# Patient Record
Sex: MALE | Race: BLACK OR AFRICAN AMERICAN | Employment: UNEMPLOYED | URBAN - METROPOLITAN AREA
[De-identification: names, ages, dates, MRNs, and addresses within clinical notes are randomized per-mention and may not be internally consistent; named-entity substitution may affect disease eponyms.]

---

## 2017-02-07 ENCOUNTER — OFFICE VISIT (OUTPATIENT)
Dept: SURGERY | Age: 42
End: 2017-02-07

## 2017-02-07 VITALS
DIASTOLIC BLOOD PRESSURE: 90 MMHG | BODY MASS INDEX: 39.7 KG/M2 | HEART RATE: 91 BPM | WEIGHT: 247 LBS | SYSTOLIC BLOOD PRESSURE: 110 MMHG | OXYGEN SATURATION: 95 % | TEMPERATURE: 98.2 F | RESPIRATION RATE: 20 BRPM | HEIGHT: 66 IN

## 2017-02-07 DIAGNOSIS — E11.22 TYPE 2 DIABETES MELLITUS WITH CHRONIC KIDNEY DISEASE ON CHRONIC DIALYSIS, WITHOUT LONG-TERM CURRENT USE OF INSULIN (HCC): ICD-10-CM

## 2017-02-07 DIAGNOSIS — E66.01 MORBID OBESITY, UNSPECIFIED OBESITY TYPE (HCC): Primary | ICD-10-CM

## 2017-02-07 DIAGNOSIS — N18.6 TYPE 2 DIABETES MELLITUS WITH CHRONIC KIDNEY DISEASE ON CHRONIC DIALYSIS, WITHOUT LONG-TERM CURRENT USE OF INSULIN (HCC): ICD-10-CM

## 2017-02-07 DIAGNOSIS — Z99.2 TYPE 2 DIABETES MELLITUS WITH CHRONIC KIDNEY DISEASE ON CHRONIC DIALYSIS, WITHOUT LONG-TERM CURRENT USE OF INSULIN (HCC): ICD-10-CM

## 2017-02-07 DIAGNOSIS — I10 ESSENTIAL HYPERTENSION: ICD-10-CM

## 2017-02-07 NOTE — PROGRESS NOTES
1. Have you been to the ER, urgent care clinic since your last visit? Hospitalized since your last visit? new patient    2. Have you seen or consulted any other health care providers outside of the [address] [place] [address] since your last visit? Include any pap smears or colon screening. New patient          Pavan Lopez. Body composition    male  35 y.o. Vitals:    02/07/17 1305   BP: 110/90   Pulse: 91   Resp: 20   Temp: 98.2 °F (36.8 °C)   SpO2: 95%   Weight: 247 lb (112 kg)   Height: 5' 6\" (1.676 m)     Body mass index is 39.87 kg/(m^2). Dilia Raoul Neck- 19.5 inches  Waist-47.5 inches  Hips-46.25 inches  Frame size-7.5 medium

## 2017-02-09 PROBLEM — N18.6 ESRD (END STAGE RENAL DISEASE) (HCC): Status: ACTIVE | Noted: 2017-02-09

## 2017-02-09 PROBLEM — E11.29 TYPE 2 DIABETES MELLITUS WITH KIDNEY COMPLICATION, WITHOUT LONG-TERM CURRENT USE OF INSULIN (HCC): Status: ACTIVE | Noted: 2017-02-09

## 2017-02-09 PROBLEM — E66.01 MORBID OBESITY (HCC): Status: ACTIVE | Noted: 2017-02-09

## 2017-02-09 PROBLEM — I10 ESSENTIAL HYPERTENSION: Status: ACTIVE | Noted: 2017-02-09

## 2017-02-09 NOTE — PATIENT INSTRUCTIONS
Learning About How to Prepare for Weight-Loss Surgery  How can you prepare for weight-loss surgery? Having weight-loss surgery (also called bariatric surgery) is a big step. You can prepare for surgery by having a plan. Your plan may include your goals for losing weight and how to makes changes in your diet, activity, and lifestyle to help raise your chances of success. One way to prepare for surgery is to think about your goal or reason why you want to reach a healthy weight. Do you want to lower your blood pressure, cholesterol, or blood sugar? Do you want to be able to sleep better, play with your kids, or walk around the block? Having a reason can help you stay with your plan and meet your goals. Your weight-loss surgery team can help you meet your goals and get ready for surgery. Jasmine Gr work with a team that's trained to help you lose weight and make healthy changes in your life. This team may include:  · A medical doctor or nurse to help manage your care and schedule tests before surgery. · A surgeon who specializes in weight-loss surgery. · A registered dietitian to help you plan meals and make changes in the way you eat. · An exercise specialist to help you be more active and get stronger. · A therapist or counselor to help you learn why you eat and teach you ways to deal with stress and your emotions. Your team will also be there to help you prepare for life after surgery. They will help you adjust to new ways of eating and changes to your body. How will weight-loss surgery affect your life? You have likely thought a lot about how surgery may affect your life--how you will eat, how your body will look, or how you will feel. Some people feel overwhelmed with these changes. But planning can help you prepare for the changes and meet your weight-loss goals. One important step in your plan is to learn about the ways surgery will affect your life. These may include:  · A slimmer you.  You probably will lose weight very quickly in the first few months after surgery. As time goes on, your weight loss will slow down. How much weight you lose depends on what type of surgery you had and how well your new eating and activity plans are working for you. · A new way of eating. Success in reaching and keeping a healthy weight depends on making lifelong changes in how you eat. After surgery, you raise your chances of success if you:  ¨ Eat just a few ounces of food at a time. ¨ Eat very slowly and chew your food to mush. ¨ Don't drink for 30 minutes before you eat, during your meal, and for 30 minutes after you eat. ¨ Are careful about drinking alcohol. ¨ Avoid foods that are high in fat or sugar. ¨ Take vitamin and mineral supplements. · A healthier you. Weight-loss surgery can have some real health benefits. Problems like diabetes, high blood pressure, and sleep apnea may go away--or at least become easier to manage. · A more active you. After surgery, being active on most days of the week will help you reach your weight goal and avoid gaining back the weight you lose. · A lot of extra skin. When you lose weight quickly, you may have a lot of extra skin. That's normal. You can have surgery to remove the extra skin if it bothers you. There are going to be some ups and downs while you get used to these changes. So another way to adjust is to identify who can help support you. Getting support from friends and family can help. And joining a support group for people who have had the surgery can be a big help too, because they know what you're going through. As you know, it's a big decision to have weight-loss surgery. But when you have a plan, you can focus on losing weight and living a healthier life. So what steps can you take to prepare for weight-loss surgery? Will you set some goals? Will you learn about how surgery can affect your life? How about asking family or friends for help? Write out your plan.  Then get ready. Where can you learn more? Go to http://britt-gita.info/. Enter F613 in the search box to learn more about \"Learning About How to Prepare for Weight-Loss Surgery. \"  Current as of: February 16, 2016  Content Version: 11.1  © 0584-9312 [employer]. Care instructions adapted under license by [employer] (which disclaims liability or warranty for this information). If you have questions about a medical condition or this instruction, always ask your healthcare professional. [address] any warranty or liability for your use of this information.

## 2017-02-09 NOTE — PROGRESS NOTES
Bariatric Surgery Consultation    Subjective: The patient is a 35 y.o. obese  male with a Body mass index is 39.87 kg/(m^2). Linden Canchola The patient has been at his heaviest weight for the past 2 years;  he has been overweight since young adulthood;  he has been considering surgery since 2016;  he desires surgery at this time because he desires renal transplant. Nae Titus. has tried multiple diets in his lifetime most recently tried unsupervised diets. Bariatric comorbidities present are   Patient Active Problem List   Diagnosis Code    Morbid obesity ([facility] E66.01    Essential hypertension I10    Type 2 diabetes mellitus with kidney complication, without long-term current use of insulin ([facility]) E11.29    ESRD (end stage renal disease) ([facility] N18.6     The patient desires laparoscopic sleeve gastrectomy for surgical weight loss. The patients goal weight is 160 lbs. The highest acceptable weight is 190 lbs. These goals are consistent with expected outcomes of their desired operation. his Medical goals are renal transplantation; his qualty of life goals are decreased medications.     Patient Active Problem List    Diagnosis Date Noted    Morbid obesity ([facility] 02/09/2017    Essential hypertension 02/09/2017    Type 2 diabetes mellitus with kidney complication, without long-term current use of insulin ([facility] 02/09/2017    [facility]) [facility] 02/09/2017      Past Surgical History   Procedure Laterality Date    Hx other surgical       left A-V graft    Hx other surgical  12/2015     Tinkcoff cath for PD, used 2 months then removed      Social History   Substance Use Topics    Smoking status: Never Smoker    Smokeless tobacco: Never Used    Alcohol use No      Family History   Problem Relation Age of Onset    Diabetes Mother     Hypertension Mother     Diabetes Maternal Aunt     Hypertension Maternal Aunt     Diabetes Maternal Grandmother     Hypertension Maternal Grandmother       Prior to Admission medications    Medication Sig Start Date End Date Taking? Authorizing Provider   HYDRALAZINE HCL (HYDRALAZINE PO) Take  by mouth. Yes Historical Provider   CARVEDILOL PO Take  by mouth. Yes Historical Provider   AMLODIPINE BESYLATE (AMLODIPINE PO) Take  by mouth. Yes Historical Provider   CALCIUM CARBONATE PO Take  by mouth three (3) times daily (with meals). Yes Historical Provider     No Known Allergies      Review of Systems:    Constitutional: positive for weight gain  Eyes: negative  Ears, nose, mouth, throat, and face: positive for snoring  Respiratory: positive for dyspnea on exertion, negative for wheezing  Cardiovascular: negative for chest pressure/discomfort, palpitations  Gastrointestinal: negative for reflux symptoms, nausea, vomiting and abdominal pain  Genitourinary:negative for urinary incontinence  Integument/breast: negative  Hematologic/lymphatic: negative  Musculoskeletal:positive for stiff joints and neck pain  Neurological: negative for paresthesia    Objective:     Visit Vitals    /90 (BP 1 Location: Right arm, BP Patient Position: Sitting)    Pulse 91    Temp 98.2 °F (36.8 °C)    Resp 20    Ht 5' 6\" (1.676 m)    Wt 247 lb (112 kg)    SpO2 95%    BMI 39.87 kg/m2       Physical Exam:  GENERAL: alert, cooperative, no distress, appears stated age, morbidly obese, EYE: negative, LYMPHATIC: Cervical, supraclavicular nodes normal. THROAT & NECK: normal, LUNG: clear to auscultation bilaterally, HEART: regular rate and rhythm, S1, S2 normal, no murmur. ABDOMEN: Obese, soft, non-tender. Bowel sounds normal. No masses,  no organomegaly, no hernia. EXTREMITIES:  Left upper arm AV fistula. SKIN: Normal., NEUROLOGIC: negative. Assessment:     Morbid obesity with comorbidities; no success with medical management. Plan:     laparoscopic sleeve gastrectomy    This is a 35 y.o. male with a BMI of Body mass index is 39.87 kg/(m^2).  and the weight-related co-morbidties listed above. Robyn Ann meets the NIH criteria for bariatric surgery based upon the BMI of Body mass index is 39.87 kg/(m^2). and multiple weight-related co-morbidities. Robyn Ann has elected laparoscopic sleeve gastrectomy as his intervention of choice for treatment of morbid obesity through surgical means secondary to its balance of safety and efficacy. In the office today, following Garrick's history and physical examination, a 30 minute discussion regarding the anatomic alterations for the laparoscopic sleeve gastrectomy was undertaken. The dietary expectations and the patient and physician dependent factors for success were thoroughly discussed, to include the need for interval follow-up and long-term dietary changes associated with success. The possible complications of the sleeve gastrectomy were also discussed, to include VTE, staple line leak, bleeding, stricture, infection, conversion to open procedure, and sleeve dilation. Specific weight related outcomes for success were also discussed with an emphasis on careful and close follow-up with the first year. The patient expressed an understanding of the above factors, and his questions were answered in their entirety. In addition, the patient attended a 1.5 hour power point seminar regarding obesity, surgical weight loss including, adjustable gastric band, gastric bypass, and sleeve gastrectomy. This discussion contrasted the different surgical techniques, mechanisms of actions and expected outcomes, and surgical and medical risks associated with each procedure. During this seminar, there was a long question and answer session where each questions was answered until there were no additional questions. Today, the patient had all of his questions answered and desires to proceed with pre-qualification for bariatric surgery initially choosing sleeve gastrectomy as his surgical option.  He will schedule Psychology evaluation and 4-month weight/diet encounters; we will arrange [facility] evaluation.     Signed By: Rod Garcia MD     February 9, 2017

## 2017-02-23 ENCOUNTER — HOSPITAL ENCOUNTER (OUTPATIENT)
Dept: DIABETES SERVICES | Age: 42
Discharge: HOME | End: 2017-02-23
Payer: COMMERCIAL

## 2017-02-23 PROCEDURE — G0108 DIAB MANAGE TRN  PER INDIV: HCPCS | Performed by: DIETITIAN, REGISTERED

## 2017-02-23 NOTE — DIABETES MGMT
[facility]  - Diabetes Assessment and   Pre-operative Bariatric Nutrition Education    2017    Referring Physician/Surgeon:   Dr. Nena Fuller  NAME: Dulce Caballero. : 1975 AGE: 35 y.o. GENDER: male  REASON FOR VISIT:  laparoscopic sleeve gastrectomy    Assessment:        Past Medical History:   Diagnosis Date    Chronic kidney disease     Diabetes ([facility]        Diabetes Medications:   none    Vitamins:     Vitamin D and Multivitamin    Food Allergies/Intolerances: none    Exercise/Physical Activity: light yard work, somewalking outside the house and pt currently on dialysis 3 days/week and disability leave from work     Anthropometrics:   Ht Readings from Last 1 Encounters:   17 5' 6\" (1.676 m)      Weight: 247.4 lb   BMI:39.9 kg/M     Obesity Class: 2    Laboratory:  No results found for: HBA1C, HGBE8, [id number]     Reported Weight History: yo-yo    Diet History: Weight Watchers     Reported Diabetes History:   Checks BG 2 times per day - fasting and after dinner.       24 hour Diet recall:  Breakfast - turkey sanchez, eggs, toast, apple/orange juice   Lunch - ham and cheese sandwich with kwon (if eating out [employer])  Dinner - baked chicken, veggie, starch (if eating out [place] food)  Snacks - [employer] chips, snack cakes, orange (halo or cuties) or apple   Beverages - apple or orange juice, water, SF Luis Angel Aid    Nutrition Diagnosis:    Food and Nutrition Related Knowledge Deficit     Lack of prior diabetes nutrition related education  Self-Monitoring Deficit    Lack of focus and and attention to detail, difficulty with time management and/or   organization    Incomplete self monitoring records i.e.  BS logs, food logs    Uncertainty of how to complete monitoring records    Verbalizes incomplete or inaccurate knowledge  Physical Inactivity   Injury, lifestyle change, condition,  physical disability or limitation that reduces   physical activityor activiites of daily living   Excessive Energy Intake     Food and nutrition related knowledge deficit concerning energy intake   Lack of value for behavior change,competing values   Unwilling or disinterested in reducing energy intake   Failure to adjust for restricted mobility due to physical limitations    BMI > 25   ` Increased body adiposity  Intake of high caloric density or large portions of food/beverage      Nutrition/Diabetes Intervention:  Patient educated on nutrition recommendations for weight loss surgery, specifically:   Reviewed intake  Understanding label reading  Understanding low carbohydrates, low sugar, higher protein meals  Understanding proper portions  Instruction given for personal dietary changes  Discussed perceived compliance       Instructed on consuming 3 meals per day, consistent in carbohydrate, starting now. Use the balanced plate method to plan meals, include 3 ounces of lean source of protein, 1/2 cup whole grains, unlimited non-starchy vegetables, 1/2 cup fruit and 1 serving of low fat dairy. Utilize handouts listing healthy snack and meal ideas to limit restaurant meals. After surgery, measure all meals to 1/2 cup. Each meal will contain a 1/4 cup lean protein and 1/4 cup fruit, non-starchy vegetable or starch (limititng to once per day). Aim for 60 grams protein per day. Sip on 48-64 ounces of sugar free, calorie free, non-carbonated beverages each day. Do not use a straw. Do not consume beverages 30 minutes before, during or 30 minutes after meals. Read all nutrition labels. Demonstrated and emphasized identifying serving size, total fat, total carbohydrate, and protein content. Defined low fat as < 3 grams per serving. Discussed lean and extra lean sources of protein. Discussed how excess sugar and fat intake may lead to dumping syndrome. Practice mindful eating habits; take small bites, chew thoroughly, avoid distractions, utilize hunger/fullness scale.   Consume meals over 20-30 minutes. Attend Bariatric Support Group and increase physical activity (approved per MD) for long term weight maintenance. Patient to check blood glucose levels 2 times per day and to continue all diabetes medications unless instructed otherwise by MD.      Nutrition Monitoring and Evaluation: The following goals were established with the patient:  -Continue checking blood sugar 2x/day   -Begin using plate method handout to put meals together   -Take at least 20-30 minutes to consume meals       Plan:     Specific tips and techniques to facilitate compliance with above recommendations were provided and discussed. Pt was encouraged to begin making necessary changes now and attend support group        []      Patient appears appropriate for surgery at this time    [x]      Patient does not appear appropriate for surgery at this time because pt not currently consuming well balanced meals or monitoring portion sizes     [x]      Patient to follow up with [facility] on 3/10/17 to reevaluate readiness for surgery - and review BG log and food log       My phone number and e-mail was provided to the patient for any further questions or concerns.         Nanette Ridley, [address] [facility]  Office: [phone]

## 2017-03-10 ENCOUNTER — HOSPITAL ENCOUNTER (OUTPATIENT)
Dept: DIABETES SERVICES | Age: 42
Discharge: HOME | End: 2017-03-10

## 2017-03-10 DIAGNOSIS — N18.6 TYPE 2 DIABETES MELLITUS WITH ESRD (END-STAGE RENAL DISEASE) (HCC): ICD-10-CM

## 2017-03-10 DIAGNOSIS — E66.01 MORBID OBESITY, UNSPECIFIED OBESITY TYPE (HCC): ICD-10-CM

## 2017-03-10 DIAGNOSIS — E11.22 TYPE 2 DIABETES MELLITUS WITH ESRD (END-STAGE RENAL DISEASE) (HCC): ICD-10-CM

## 2017-03-10 NOTE — DIABETES MGMT
[facility]  - Diabetes Assessment and   Pre-operative Bariatric Nutrition Education    3/10/2017    Referring Physician/Surgeon:   Dr. Beba Guerra  NAME: Estrada Godinez. : 1975 AGE: 35 y.o.   GENDER: male  REASON FOR FOLLOW UP VISIT:  laparoscopic sleeve gastrectomy    Assessment:        Diabetes Self Care Update:   Since last visit pt has begun implementing changes necessary in preparation for surgery:  -Taking 20-30 minutes to complete meals  -Taking small bites of food  -Sipping on beverages       Diabetes Medications:   none    Exercise/Physical Activity: Pt is walking 3x/week      Anthropometrics:   Ht Readings from Last 1 Encounters:   17 5' 6\" (1.676 m)      Weight: 245.6 lb           Down 1.8lbs  BMI: 39.6 kg/M         Laboratory:  No results found for: HBA1C, HGBE8, [id number]     Goal achievement from previous visit ? : yes       Nutrition Diagnosis:    Food and Nutrition Related Knowledge Deficit     Prior exposure to incorrect information   Unwilling or disinterested in learning/applying information    Verbalizes inaccurate or incomplete information    No prior knowledge of need for food and nutrition recommendations    No prior education provided on how to apply food and nutrition     recommendations    Verbalizes unwillingness or disinterest in learning information  Physical Inactivity   Injury, lifestyle change, condition,  physical disability or limitation that reduces   physical activityor activiites of daily living    Time constraints    Medical dx that may be associated with or results in decreased activity,    e.g. arthritis, morbid obesity, knee surgery  Excessive Energy Intake     Food and nutrition related knowledge deficit concerning energy intake   Failure to adjust for restricted mobility due to physical limitations    BMI > 25   ` Increased body adiposity      Nutrition/Diabetes Intervention:  Patient educated on nutrition recommendations for weight loss surgery, specifically Reviewed intake  Understanding proper portions  Instruction given for personal dietary changes     . Instructed on consuming 3 meals per day, consistent in carbohydrate, starting now. Use the balanced plate method to plan meals, include 3 ounces of lean source of protein, 1/2 cup whole grains, unlimited non-starchy vegetables, 1/2 cup fruit and 1 serving of low fat dairy. Utilize handouts listing healthy snack and meal ideas to limit restaurant meals. After surgery, measure all meals to 1/2 cup. Each meal will contain a 1/4 cup lean protein and 1/4 cup fruit, non-starchy vegetable or starch (limititng to once per day). Aim for 60 grams protein per day. Sip on 48-64 ounces of sugar free, calorie free, non-carbonated beverages each day. Do not use a straw. Do not consume beverages 30 minutes before, during or 30 minutes after meals. Read all nutrition labels. Demonstrated and emphasized identifying serving size, total fat, total carbohydrate, and protein content. Defined low fat as < 3 grams per serving. Discussed lean and extra lean sources of protein. Discussed how excess sugar and fat intake may lead to dumping syndrome. Practice mindful eating habits; take small bites, chew thoroughly, avoid distractions, utilize hunger/fullness scale. Consume meals over 20-30 minutes. Attend Bariatric Support Group and increase physical activity (approved per MD) for long term weight maintenance. Patient to check blood glucose levels 2 times per day and to continue all diabetes medications unless instructed otherwise by MD.      Nutrition Monitoring and Evaluation:     The following goals were established/continued :    -Continue checking BG 2x/day (BG also checked during dialysis sessions)  -Continue using plate images provided for well balanced meals    Plan:           [x]      Patient appears appropriate for surgery at this time      My phone number and e-mail was provided to the patient for any further questions or concerns.       Reuben Jackson, [address]  Office: [phone]

## 2017-04-06 ENCOUNTER — CLINICAL SUPPORT (OUTPATIENT)
Dept: SURGERY | Age: 42
End: 2017-04-06

## 2017-04-06 VITALS — BODY MASS INDEX: 40.03 KG/M2 | WEIGHT: 248 LBS

## 2017-04-06 DIAGNOSIS — E66.01 MORBID OBESITY WITH BMI OF 40.0-44.9, ADULT (HCC): Primary | ICD-10-CM

## 2017-04-06 NOTE — PROGRESS NOTES
[facility] General Surgery at [facility]  Supervised Weight Loss     Date:   2017    Patient's Name: Kristen Sellers. : 1975    Insurance:  Medicare          Session:   Surgery: Sleeve Gastrectomy  Surgeon:  Keith Montoya M.D. Height: 66\"  Weight:    248      Lbs. BMI: 40   Pounds Lost since last month: n/a               Pounds Gained since last month: n/a     Starting Weight: 248#   Previous Months Weight: n/a   Overall Pounds Lost: n/a   Overall Pounds Gained: n/a     Other Pertinent Information: Patient reports being a dialysis patient. Has [facility]. No HgbA1c value was indicated during those visits. Smoking Status:  none  Alcohol Intake: none    I have reviewed with patient the guidelines of the supervised weight loss class. Patient understands the expectations of some weight loss during the weight loss trial.  Patient understands that weight gain could delay the process. I have also expressed to patient that classes need to be consecutive. Missing a class may subject patient to have to start their trial over. Patient has received this information in writing. Changes that patient has made since last month include:  Walking, portion control. Eating Habits and Behaviors      Today we reviewed the general diet principles for weight loss surgery. We discussed portion control before surgery using the balanced plate method (1/2 plate non-starchy vegetables, 1/4 coming from lean meat, and 1/4 of their plate coming from carbohydrates). We talked about the importance of measuring meals to 1/2 cup total after surgery and making at least half of the meal lean protein. Emphasis was placed on the importance of eating 3 meals a day and aiming for 60 grams of protein per day. I educated the patient on limiting liquid calories and drinking only calorie-free, sugar-free and non-carbonated beverages.  We discussed the importance of drinking 64 ounces of fluid per day to prevent dehydration post-operatively. Patient's current diet habits include: eating 3 meals a day, snacking on chips and cookies. Eating chips, pretzels, crackers, bread, pasta, rice and cereal 4 times per week. Eating cookies, cake and donuts 3 times per week. Pt and wife share grocery shopping and cooking. Eating a mixture of baked, grilled, broiled and fried foods. Eating out is 1-3 times per week. Drinking 32 oz water daily (will need to verify if patient has a fluid restriction d/t dialysis). Reports sometimes emotional eating and yes to situational eating. Is not packing meals when away from home. Eating most meals at a table and takes 10 minutes or less to finish the meal. States night eating and portion sizes are his biggest challenge with losing weight. Physical Activity/Exercise  During class we discussed the importance of increasing daily physical activity and beginning to develop an exercise regimen/routine. An education lesson was provided including exercise programs and resources, tips for getting started and overcoming barriers and health benefits of exercise. We discussed that exercise is an important part of long term weight loss. Comments:  During class, I discussed with patient the importance of getting into an exercise routine. Patient is currently walking for activity. States lack of motivation is a challenge for being consistent. Patient has been encouraged to consider short intervals or focusing on health benefit of exercise to stay motivated. Increase frequency, duration and/or intensity of walking over time. Behavior Modification       Comments:  Patient was encouraged to keep a food journal and record what they were taking in daily. We discussed the importance of making certain behavior changes in order to be successful long term after weight loss surgery.           Overall Assessment: Patient completes his first session of supervised weight loss today. Difficult to assess readiness at this time d/t limited participation in group discussion and did not ask any questions. Concerns for pt regarding possible fluid restriction d/t being a dialysis patient. Has [facility] however, HgbA1c value was obtained during those appointments. Based on patient's answers to diet questionnaire today he has some very important changes to make with current eating habits and food choices. Will continue to assess as he works to complete supervised weight loss requirements. Goals:   1. Nutrition - portion control   2. Exercise - exercise 4 times per week  3.  Behavior -more positive about exercise     Juliano Jasmine, RD  4/6/2017

## 2017-05-04 ENCOUNTER — CLINICAL SUPPORT (OUTPATIENT)
Dept: SURGERY | Age: 42
End: 2017-05-04

## 2017-05-04 VITALS — BODY MASS INDEX: 39.71 KG/M2 | WEIGHT: 246 LBS

## 2017-05-04 DIAGNOSIS — E66.9 OBESITY (BMI 30-39.9): Primary | ICD-10-CM

## 2017-05-04 NOTE — PROGRESS NOTES
[facility] General Surgery at [facility]  Supervised Weight Loss     Date:   2017    Patient's Name: Kristy Jones. : 1975    Insurance:  Medicare          Session: 2 of  4  Surgery: Sleeve Gastrectomy  Surgeon:  Alexander Dumont M.D. Height: 66\"   Weight:    246      Lbs. BMI: 39   Pounds Lost since last month: 2#               Pounds Gained since last month: 0    Starting Weight: 248#   Previous Months Weight: 248#  Overall Pounds Lost: 2#  Overall Pounds Gained: 0    Other Pertinent Information: n/a     Smoking Status:  None   Alcohol Intake: none     I have reviewed with patient the guidelines of the supervised weight loss class. Patient understands the expectations of some weight loss during the weight loss trial.  Patient understands that weight gain could delay the process. I have also expressed to patient that classes need to be consecutive. Missing a class may subject patient to have to start their trial over. Patient has received this information in writing. Changes that patient has made since last month include:  More walking, only baked foods. Eating Habits and Behaviors      Today we reviewed the general diet principles for weight loss surgery. An education lesson was provided regarding carbohydrates including which types of foods provide carbohydrates and the difference between complex carbohydrates and simple carbohydrates. We discussed tips for reading food labels to identify added sugars for help with making healthier food choices. We discussed portion control before surgery using the balanced plate method (1/2 plate non-starchy vegetables, 1/4 coming from lean meat, and 1/4 of their plate coming from carbohydrates). We talked about the importance of measuring meals to 1/2 cup total after surgery and making at least half of the meal lean protein. Emphasis was placed on the importance of eating 3 meals a day and aiming for 60 grams of protein per day. I educated the patient on limiting liquid calories and drinking only calorie-free, sugar-free and non-carbonated beverages. We discussed the importance of drinking 64 ounces of fluid per day to prevent dehydration post-operatively. Patient's current diet habits include: eating 4 meals per day, snacking on chips once a day. Avoiding sweets/desserts. Pt and wife share grocery shopping and cooking. Eating mostly baked, grilled and broiled foods. Never eating out at restaurants. Drinking 32 oz water daily or every 2 days because of a fluid restriction d/t dialysis. 12 oz unsweetened tea. Denies emotional eating and yes to situational eating. Packing meals when away from home. Eating most meals at a table and takes 15-20 minutes to finish the meal. Reports grazing and snacking as biggest barriers to weight loss. Physical Activity/Exercise  During class we discussed the importance of increasing daily physical activity and beginning to develop an exercise regimen/routine. An education lesson was provided including exercise programs and resources, tips for getting started and overcoming barriers and health benefits of exercise. We discussed that exercise is an important part of long term weight loss. Comments:  During class, I discussed with patient the importance of getting into an exercise routine. Patient is currently walking for activity. Patient has been encouraged to increase frequency, duration and/or intensity for wt loss. Behavior Modification       Comments:  Patient was encouraged to keep a food journal and record what they were taking in daily. We discussed the importance of making certain behavior changes in order to be successful long term after weight loss surgery. Overall Assessment: Patient demonstrates small/realistic lifestyle changes in preparation for weight loss surgery evidenced by reported changes and 2# wt loss.  Appears to understand the basic nutrition guidelines with minimal participation in group discussion and asks minimal questions. Pt is on dialysis and does follow a 32 oz fluid restriction. Will continue to assess readiness as he works to complete supervised wt loss requirements. Goals:   1. Nutrition - cut back on sugar and eat more baked foods  2. Exercise - walk 1 mile daily   3.  Behavior - cut back on snacking after meals     Remo Jeans, MARY  5/4/2017

## 2017-06-08 ENCOUNTER — CLINICAL SUPPORT (OUTPATIENT)
Dept: SURGERY | Age: 42
End: 2017-06-08

## 2017-06-08 VITALS — BODY MASS INDEX: 39.54 KG/M2 | WEIGHT: 245 LBS

## 2017-06-08 DIAGNOSIS — E66.9 OBESITY (BMI 30-39.9): Primary | ICD-10-CM

## 2017-06-08 NOTE — PROGRESS NOTES
[facility] General Surgery at [facility]  Supervised Weight Loss     Date:   2017    Patient's Name: Jonathan Rasmussen. : 1975    Insurance:  Medicare          Session: 3 of  4  Surgery: Sleeve Gastrectomy  Surgeon:  Aman Kuo M.D. Height: 66\"  Weight:    245      Lbs. BMI: 39   Pounds Lost since last month: 1#               Pounds Gained since last month: 0    Starting Weight: 248#   Previous Months Weight: 246#  Overall Pounds Lost: 3#  Overall Pounds Gained: 0    Other Pertinent Information: n/a     Smoking Status:  None   Alcohol Intake: intake not reported    I have reviewed with patient the guidelines of the supervised weight loss class. Patient understands the expectations of some weight loss during the weight loss trial.  Patient understands that weight gain could delay the process. I have also expressed to patient that classes need to be consecutive. Missing a class may subject patient to have to start their trial over. Patient has received this information in writing. Changes that patient has made since last month include: no changes reported on lifestyle questionnaire. Eating Habits and Behaviors      Today we reviewed the general diet principles for weight loss surgery. I have talked with patient about what their plate should be made up of. Their plate should be made up of 1/2 coming from non-starchy vegetables, 1/4 coming from lean meat, and 1/4 of their plate coming from carbohydrates, including fruits, starches, or milk. Emphasis was placed on the importance of eating 3 meals a day and aiming for 60 grams of protein per day. A nutrition education lesson was provided focused on the importance of protein intake after weight loss surgery. I educated the patient on food sources of protein, grams of protein per serving, using protein supplements, how to purchase protein powder and how to use protein shakes to help meet post-operative protein needs.  We also reviewed protein drinks that would be acceptable. Patient's current diet habits include: eating 4 meals per day. Snacking is not indicated on lifestyle questionnaire. Pt is limiting refined carbohydrates and avoiding sweets/desserts. Pt and wife share grocery shopping and cooking. Eating mostly baked,grilled and broiled foods. Eating out is daily. Drinking 36 oz fluids daily and does not drink more than that d/t fluid restriction with dialysis. Denies emotional and situational eating. Packing meals when away from home. Eating most meals at a table and takes 20 minutes or more to finish the meal. Reports late night eating as his biggest barrier to weight loss. Physical Activity/Exercise  During class we discussed the importance of increasing daily physical activity and beginning to develop an exercise regimen/routine. An education lesson was provided including exercise programs and resources, tips for getting started and overcoming barriers and health benefits of exercise. We discussed that exercise is an important part of long term weight loss. Comments:  During class, I discussed with patient the importance of getting into an exercise routine. Patient is currently walking, running for activity. Patient has been encouraged to maintain and increase as tolerated. Behavior Modification       Comments:  Patient was encouraged to keep a food journal and record what they were taking in daily. We discussed the importance of making certain behavior changes in order to be successful long term after weight loss surgery. Overall Assessment: Patient demonstrates appropriate lifestyle changes evidenced by wt loss and maintenance of daily eating habits. Pt is a dialysis patient and we will need to coordinate with his dietitian to ensure appropriate protein shakes and vitamins for after surgery.  He is also on a fluid restriction of 36 oz per day and will need to confirm if this will be adequate after wt loss surgery to prevent dehydration issues. Goals:   1. Nutrition - eat more fruit   2. Exercise - working out for 90 minutes per day   3.  Behavior - get 8 hours sleep per night     Jose Bennett RD  6/8/2017

## 2017-07-12 ENCOUNTER — CLINICAL SUPPORT (OUTPATIENT)
Dept: SURGERY | Age: 42
End: 2017-07-12

## 2017-07-12 VITALS — BODY MASS INDEX: 39.22 KG/M2 | WEIGHT: 243 LBS

## 2017-07-12 DIAGNOSIS — E66.9 OBESITY (BMI 30-39.9): Primary | ICD-10-CM

## 2017-07-12 NOTE — PROGRESS NOTES
[employer] General Surgery at [facility]  Supervised Weight Loss     Date:   2017    Patient's Name: Berto Judge. : 1975    Insurance:  Medicare          Session: 4 of  4  Surgery: Sleeve Gastrectomy  Surgeon:  Dana Flowers M.D. Height: 66\"  Weight:    243      Lbs. BMI: 39   Pounds Lost since last month: 2#               Pounds Gained since last month: 0    Starting Weight: 248#   Previous Months Weight: 245#  Overall Pounds Lost: 5#  Overall Pounds Gained: 0    Other Pertinent Information: Pt is a dialysis patient and has fluid restriction. Will also need special protein shakes and vitamin/mineral supplements for after surgery. Smoking Status:  None   Alcohol Intake: none     I have reviewed with patient the guidelines of the supervised weight loss class. Patient understands the expectations of some weight loss during the weight loss trial.  Patient understands that weight gain could delay the process. I have also expressed to patient that classes need to be consecutive. Missing a class may subject patient to have to start their trial over. Patient has received this information in writing. Changes that patient has made since last month include: Increased walking. Eating Habits and Behaviors      Today we reviewed the general diet principles for weight loss surgery. I have talked with patient about what their plate should be made up of. Their plate should be made up of 1/2 coming from non-starchy vegetables, 1/4 coming from lean meat, and 1/4 of their plate coming from carbohydrates, including fruits, starches, or milk. Emphasis was placed on the importance of eating 3 meals a day and aiming for 60 grams of protein per day. A nutrition education lesson was provided focused on the importance of taking vitamins after weight loss surgery.  I educated the patient on the different types and doses of vitamins, various brands and how to space out vitamins throughout the day for optimal absorption. Patient's current diet habits include: eating 4 meals per day. Denies snacking and denies intake of refined carbohydrates, sweets, desserts and added sugars. Pt and wife share grocery shopping and cooking. Eating mostly baked, grilled and broiled foods. Eating out is \"daily\". Drinking 32 oz water daily and is restricted to no more than 32 oz per day d/t dialysis. Denies emotional or situational eating. Packing meals to take when away from home. Eating most meals at a table and takes 15-20 minutes to finish the meal. Reports late night eating is his biggest barrier to weight loss. Physical Activity/Exercise  During class we discussed the importance of increasing daily physical activity and beginning to develop an exercise regimen/routine. An education lesson was provided including exercise programs and resources, tips for getting started and overcoming barriers and health benefits of exercise. We discussed that exercise is an important part of long term weight loss. Comments:  During class, I discussed with patient the importance of getting into an exercise routine. Patient is currently weight lifting and walking for activity. Patient has been encouraged to maintain and increase over time as tolerated. Behavior Modification       Comments:  Patient was encouraged to keep a food journal and record what they were taking in daily. We discussed the importance of making certain behavior changes in order to be successful long term after weight loss surgery. Overall Assessment: Patient demonstrates appropriate lifestyle changes evidenced by weight loss and reported changes. Pt with minimal participation in group discussions and class setting making it difficult to assess knowledge and understanding.  Based on responses to monthly lifestyle questionnair it appears pt has made appropriate changes and understands the basic nutrition guidelines for weight loss surgery. [facility] which may provide more individualized assessment. Pt is on a fluid restriction d/t dialysis and will also likely need special protein shakes and vitamins for after surgery. Will likely need nutrition counseling after surgery to ensure his special nutrition needs are met. Goals:   1. Nutrition - \"eat good all the time\"   2. Exercise - work out 6 days per week  3.  Behavior - stay focused on healthy food choices     Robyn Vela RD  7/12/2017

## 2017-09-26 RX ORDER — ENOXAPARIN SODIUM 100 MG/ML
40 INJECTION SUBCUTANEOUS
Status: CANCELLED | OUTPATIENT
Start: 2017-09-26

## 2017-09-28 ENCOUNTER — OFFICE VISIT (OUTPATIENT)
Dept: SURGERY | Age: 42
End: 2017-09-28

## 2017-09-28 ENCOUNTER — HOSPITAL ENCOUNTER (OUTPATIENT)
Dept: PREADMISSION TESTING | Age: 42
Discharge: HOME | End: 2017-09-28
Payer: COMMERCIAL

## 2017-09-28 ENCOUNTER — HOSPITAL ENCOUNTER (OUTPATIENT)
Dept: GENERAL RADIOLOGY | Age: 42
Discharge: HOME | End: 2017-09-28
Payer: COMMERCIAL

## 2017-09-28 VITALS
WEIGHT: 244.5 LBS | HEIGHT: 66 IN | BODY MASS INDEX: 39.29 KG/M2 | HEART RATE: 69 BPM | RESPIRATION RATE: 20 BRPM | OXYGEN SATURATION: 98 % | TEMPERATURE: 98.4 F

## 2017-09-28 VITALS
DIASTOLIC BLOOD PRESSURE: 90 MMHG | TEMPERATURE: 98 F | WEIGHT: 244.5 LBS | RESPIRATION RATE: 20 BRPM | OXYGEN SATURATION: 98 % | SYSTOLIC BLOOD PRESSURE: 120 MMHG | BODY MASS INDEX: 39.29 KG/M2 | HEIGHT: 66 IN | HEART RATE: 101 BPM

## 2017-09-28 VITALS
BODY MASS INDEX: 39.29 KG/M2 | SYSTOLIC BLOOD PRESSURE: 120 MMHG | OXYGEN SATURATION: 98 % | HEIGHT: 66 IN | DIASTOLIC BLOOD PRESSURE: 90 MMHG | HEART RATE: 96 BPM | TEMPERATURE: 98 F | WEIGHT: 244.5 LBS | RESPIRATION RATE: 20 BRPM

## 2017-09-28 DIAGNOSIS — N18.6 ESRD (END STAGE RENAL DISEASE) (HCC): ICD-10-CM

## 2017-09-28 DIAGNOSIS — E66.01 MORBID OBESITY, UNSPECIFIED OBESITY TYPE (HCC): Primary | ICD-10-CM

## 2017-09-28 DIAGNOSIS — I10 ESSENTIAL HYPERTENSION: ICD-10-CM

## 2017-09-28 DIAGNOSIS — E66.01 MORBID OBESITY WITH BMI OF 40.0-44.9, ADULT (HCC): Primary | ICD-10-CM

## 2017-09-28 LAB
25(OH)D3 SERPL-MCNC: 10 NG/ML (ref 30–100)
ALBUMIN SERPL-MCNC: 3.9 G/DL (ref 3.5–5)
ALBUMIN/GLOB SERPL: 0.8 {RATIO} (ref 1.1–2.2)
ALP SERPL-CCNC: 127 U/L (ref 45–117)
ALT SERPL-CCNC: 16 U/L (ref 12–78)
ANION GAP SERPL CALC-SCNC: 15 MMOL/L (ref 5–15)
APPEARANCE UR: ABNORMAL
AST SERPL-CCNC: 7 U/L (ref 15–37)
ATRIAL RATE: 91 BPM
BACTERIA URNS QL MICRO: NEGATIVE /HPF
BASOPHILS # BLD: 0 K/UL (ref 0–0.1)
BASOPHILS NFR BLD: 0 % (ref 0–1)
BILIRUB SERPL-MCNC: 0.4 MG/DL (ref 0.2–1)
BILIRUB UR QL: NEGATIVE
BUN SERPL-MCNC: 55 MG/DL (ref 6–20)
BUN/CREAT SERPL: 5 (ref 12–20)
CALCIUM SERPL-MCNC: 7.7 MG/DL (ref 8.5–10.1)
CALCULATED P AXIS, ECG09: 47 DEGREES
CALCULATED R AXIS, ECG10: -7 DEGREES
CALCULATED T AXIS, ECG11: 23 DEGREES
CHLORIDE SERPL-SCNC: 99 MMOL/L (ref 97–108)
CHOLEST SERPL-MCNC: 146 MG/DL
CO2 SERPL-SCNC: 24 MMOL/L (ref 21–32)
COLOR UR: ABNORMAL
CREAT SERPL-MCNC: 10.5 MG/DL (ref 0.7–1.3)
CRP SERPL-MCNC: 0.83 MG/DL (ref 0–0.6)
DIAGNOSIS, 93000: NORMAL
EOSINOPHIL # BLD: 0.2 K/UL (ref 0–0.4)
EOSINOPHIL NFR BLD: 3 % (ref 0–7)
EPITH CASTS URNS QL MICRO: ABNORMAL /LPF
ERYTHROCYTE [DISTWIDTH] IN BLOOD BY AUTOMATED COUNT: 16.1 % (ref 11.5–14.5)
GLOBULIN SER CALC-MCNC: 4.6 G/DL (ref 2–4)
GLUCOSE SERPL-MCNC: 110 MG/DL (ref 65–100)
GLUCOSE UR STRIP.AUTO-MCNC: 250 MG/DL
HCT VFR BLD AUTO: 37.3 % (ref 36.6–50.3)
HGB BLD-MCNC: 11.3 G/DL (ref 12.1–17)
HGB UR QL STRIP: ABNORMAL
KETONES UR QL STRIP.AUTO: NEGATIVE MG/DL
LEUKOCYTE ESTERASE UR QL STRIP.AUTO: ABNORMAL
LYMPHOCYTES # BLD: 2.5 K/UL (ref 0.8–3.5)
LYMPHOCYTES NFR BLD: 37 % (ref 12–49)
MAGNESIUM SERPL-MCNC: 2 MG/DL (ref 1.6–2.4)
MCH RBC QN AUTO: 29.7 PG (ref 26–34)
MCHC RBC AUTO-ENTMCNC: 30.3 G/DL (ref 30–36.5)
MCV RBC AUTO: 98.2 FL (ref 80–99)
MONOCYTES # BLD: 0.5 K/UL (ref 0–1)
MONOCYTES NFR BLD: 7 % (ref 5–13)
NEUTS SEG # BLD: 3.6 K/UL (ref 1.8–8)
NEUTS SEG NFR BLD: 53 % (ref 32–75)
NITRITE UR QL STRIP.AUTO: NEGATIVE
P-R INTERVAL, ECG05: 138 MS
PH UR STRIP: 5.5 [PH] (ref 5–8)
PLATELET # BLD AUTO: 214 K/UL (ref 150–400)
POTASSIUM SERPL-SCNC: 4.6 MMOL/L (ref 3.5–5.1)
PROT SERPL-MCNC: 8.5 G/DL (ref 6.4–8.2)
PROT UR STRIP-MCNC: 300 MG/DL
Q-T INTERVAL, ECG07: 394 MS
QRS DURATION, ECG06: 98 MS
QTC CALCULATION (BEZET), ECG08: 484 MS
RBC # BLD AUTO: 3.8 M/UL (ref 4.1–5.7)
RBC #/AREA URNS HPF: ABNORMAL /HPF (ref 0–5)
SODIUM SERPL-SCNC: 138 MMOL/L (ref 136–145)
SP GR UR REFRACTOMETRY: 1.02 (ref 1–1.03)
T4 FREE SERPL-MCNC: 0.9 NG/DL (ref 0.8–1.5)
TSH SERPL DL<=0.05 MIU/L-ACNC: 1.17 UIU/ML (ref 0.36–3.74)
UA: UC IF INDICATED,UAUC: ABNORMAL
UROBILINOGEN UR QL STRIP.AUTO: 0.2 EU/DL (ref 0.2–1)
VENTRICULAR RATE, ECG03: 91 BPM
WBC # BLD AUTO: 6.8 K/UL (ref 4.1–11.1)
WBC URNS QL MICRO: ABNORMAL /HPF (ref 0–4)

## 2017-09-28 PROCEDURE — 83735 ASSAY OF MAGNESIUM: CPT | Performed by: SURGERY

## 2017-09-28 PROCEDURE — 86140 C-REACTIVE PROTEIN: CPT | Performed by: SURGERY

## 2017-09-28 PROCEDURE — 85025 COMPLETE CBC W/AUTO DIFF WBC: CPT | Performed by: SURGERY

## 2017-09-28 PROCEDURE — 82465 ASSAY BLD/SERUM CHOLESTEROL: CPT | Performed by: SURGERY

## 2017-09-28 PROCEDURE — 84439 ASSAY OF FREE THYROXINE: CPT | Performed by: SURGERY

## 2017-09-28 PROCEDURE — 71020 XR CHEST PA LAT: CPT

## 2017-09-28 PROCEDURE — 81001 URINALYSIS AUTO W/SCOPE: CPT | Performed by: SURGERY

## 2017-09-28 PROCEDURE — 36415 COLL VENOUS BLD VENIPUNCTURE: CPT | Performed by: SURGERY

## 2017-09-28 PROCEDURE — 82306 VITAMIN D 25 HYDROXY: CPT | Performed by: SURGERY

## 2017-09-28 PROCEDURE — 93005 ELECTROCARDIOGRAM TRACING: CPT

## 2017-09-28 PROCEDURE — 84443 ASSAY THYROID STIM HORMONE: CPT | Performed by: SURGERY

## 2017-09-28 PROCEDURE — 80053 COMPREHEN METABOLIC PANEL: CPT | Performed by: SURGERY

## 2017-09-28 RX ORDER — ONDANSETRON 4 MG/1
4 TABLET, ORALLY DISINTEGRATING ORAL
Qty: 20 TAB | Refills: 0 | Status: SHIPPED | OUTPATIENT
Start: 2017-09-28 | End: 2017-10-26

## 2017-09-28 RX ORDER — HYOSCYAMINE SULFATE 0.12 MG/1
0.12 TABLET SUBLINGUAL
Qty: 30 TAB | Refills: 0 | Status: SHIPPED | OUTPATIENT
Start: 2017-09-28 | End: 2017-10-26 | Stop reason: ALTCHOICE

## 2017-09-28 RX ORDER — PHENOL/SODIUM PHENOLATE
20 AEROSOL, SPRAY (ML) MUCOUS MEMBRANE DAILY
Qty: 30 TAB | Refills: 1 | Status: SHIPPED | OUTPATIENT
Start: 2017-09-28

## 2017-09-28 NOTE — PATIENT INSTRUCTIONS
Start the pre op diet     Look into an unflavored whey or plant protein:  Juan Trejo (www.[employer]. [employer]) or [employer] (on [employer])    Oscar Shaw is available at [employer] or [employer]

## 2017-09-28 NOTE — PROGRESS NOTES
1. Have you been to the ER, urgent care clinic since your last visit? Hospitalized since your last visit? 2. Have you seen or consulted any other health care providers outside of the [address] [place] [address] since your last visit? Include any pap smears or colon screening.  no   no

## 2017-09-28 NOTE — MR AVS SNAPSHOT
Visit Information Date & Time Provider Department Dept. Phone Encounter #  
 9/28/2017  1:40 PM Nadia Johnson NP [facility] GENERAL SURGERY [phone] [id number] Upcoming Health Maintenance Date Due HEMOGLOBIN A1C Q6M 1975 LIPID PANEL Q1 1975 FOOT EXAM Q1 9/17/1983 MICROALBUMIN Q1 9/17/1983 EYE EXAM RETINAL OR DILATED Q1 9/17/1983 Pneumococcal 19-64 Highest Risk (1 of 3 - PCV13) 9/17/1992 DTaP/Tdap/Td series (1 - Tdap) 9/17/1994 INFLUENZA AGE 9 TO ADULT 8/1/2017 Allergies as of 9/28/2017  Review Complete On: 9/28/2017 By: Nadia Johnson NP No Known Allergies Current Immunizations  Never Reviewed No immunizations on file. Not reviewed this visit You Were Diagnosed With   
  
 Codes Comments Morbid obesity with BMI of 40.0-44.9, adult (HCC)    -  Primary ICD-10-CM: E66.01, Z68.41 
ICD-9-CM: 278.01, V85.41   
 ESRD (end stage renal disease) ([facility] [address]     ICD-10-CM: N18.6 ICD-9-CM: 585.6 Essential hypertension     ICD-10-CM: I10 
ICD-9-CM: 401.9 Vitals BP Pulse Temp Resp Height(growth percentile) Weight(growth percentile) 120/90 (!) 101 98 °F (36.7 °C) (Oral) 20 5' 5.5\" (1.664 m) 244 lb 8 oz (110.9 kg) SpO2 BMI Smoking Status 98% 40.07 kg/m2 Never Smoker Vitals History BMI and BSA Data Body Mass Index Body Surface Area 40.07 kg/m 2 2.26 m 2 Preferred Pharmacy Pharmacy Name Phone [id number] [place], [address] [facility] [phone] Your Updated Medication List  
  
   
This list is accurate as of: 9/28/17  2:49 PM.  Always use your most recent med list.  
  
  
  
  
 hyoscyamine SL 0.125 mg SL tablet Commonly known as:  LEVSIN/SL  
1 Tab by SubLINGual route every four (4) hours as needed for Cramping.  After surgery as needed for pressure and spasm upper abdomen / chest  
  
 Omeprazole delayed release 20 mg tablet Commonly known as:  PRILOSEC D/R Take 1 Tab by mouth daily. Daily after surgery  
  
 ondansetron 4 mg disintegrating tablet Commonly known as:  ZOFRAN ODT Take 1 Tab by mouth every eight (8) hours as needed for Nausea. After surgery Prescriptions Sent to Pharmacy Refills Omeprazole delayed release (PRILOSEC D/R) 20 mg tablet 1 Sig: Take 1 Tab by mouth daily. Daily after surgery Class: Normal  
 Pharmacy: [facility][id number] [address], [address] [facility] Ph #: [phone] Route: Oral  
 ondansetron (ZOFRAN ODT) 4 mg disintegrating tablet 0 Sig: Take 1 Tab by mouth every eight (8) hours as needed for Nausea. After surgery Class: Normal  
 Pharmacy: [facility]/pharmacy #[id number] [address], [address] [facility] Ph #: [phone] Route: Oral  
 hyoscyamine SL (LEVSIN/SL) 0.125 mg SL tablet 0 Si Tab by SubLINGual route every four (4) hours as needed for Cramping. After surgery as needed for pressure and spasm upper abdomen / chest  
 Class: Normal  
 Pharmacy: [facility]/pharmacy #[id number] Fred [address], [address] [facility] Ph #: [phone] Route: SubLINGual  
  
Patient Instructions Start the pre op diet Look into an unflavored whey or plant protein:  Lori Rock (www.[employer]) or Genpro (on [employer]) Isopur is available at [facility] or [employer] outdoors Shop [facility]! [facility] introduces Nujira patient portal. Now you can access parts of your medical record, email your doctor's office, and request medication refills online. 1. In your internet browser, go to [email or web address].hart 2. Click on the First Time User? Click Here link in the Sign In box. You will see the New Member Sign Up page. 3. Enter your Familybuilder Access Code exactly as it appears below. You will not need to use this code after youve completed the sign-up process. If you do not sign up before the expiration date, you must request a new code. · Familybuilder Access Code: [email or web address] Expires: 12/27/2017  9:42 AM 
 
4. Enter the last four digits of your Social Security Number (xxxx) and Date of Birth (mm/dd/yyyy) as indicated and click Submit. You will be taken to the next sign-up page. 5. Create a Familybuilder ID. This will be your Familybuilder login ID and cannot be changed, so think of one that is secure and easy to remember. 6. Create a Familybuilder password. You can change your password at any time. 7. Enter your Password Reset Question and Answer. This can be used at a later time if you forget your password. 8. Enter your e-mail address. You will receive e-mail notification when new information is available in [address]. 9. Click Sign Up. You can now view and download portions of your medical record. 10. Click the Download Summary menu link to download a portable copy of your medical information. If you have questions, please visit the Frequently Asked Questions section of the [employer]builder website. Remember, Familybuilder is NOT to be used for urgent needs. For medical emergencies, dial 911. Now available from your iPhone and Android! Please provide this summary of care documentation to your next provider. Your primary care clinician is listed as Alivia Guillen. If you have any questions after today's visit, please call [phone].

## 2017-09-28 NOTE — PROGRESS NOTES
1. Have you been to the ER, urgent care clinic since your last visit? Hospitalized since your last visit? No    2. Have you seen or consulted any other health care providers outside of the [address] [place] [address] since your last visit? Include any pap smears or colon screening.  No

## 2017-09-29 ENCOUNTER — TELEPHONE (OUTPATIENT)
Dept: SURGERY | Age: 42
End: 2017-09-29

## 2017-09-29 ENCOUNTER — DOCUMENTATION ONLY (OUTPATIENT)
Dept: SURGERY | Age: 42
End: 2017-09-29

## 2017-09-29 NOTE — PATIENT INSTRUCTIONS
Learning About How to Prepare for Weight-Loss Surgery  How can you prepare for weight-loss surgery? Having weight-loss surgery (also called bariatric surgery) is a big step. You can prepare for surgery by having a plan. Your plan may include your goals for losing weight and how to makes changes in your diet, activity, and lifestyle to help raise your chances of success. One way to prepare for surgery is to think about your goal or reason why you want to reach a healthy weight. Do you want to lower your blood pressure, cholesterol, or blood sugar? Do you want to be able to sleep better, play with your kids, or walk around the block? Having a reason can help you stay with your plan and meet your goals. Your weight-loss surgery team can help you meet your goals and get ready for surgery. Mansi Angulo work with a team that's trained to help you lose weight and make healthy changes in your life. This team may include:  · A medical doctor or nurse to help manage your care and schedule tests before surgery. · A surgeon who specializes in weight-loss surgery. · A registered dietitian to help you plan meals and make changes in the way you eat. · An exercise specialist to help you be more active and get stronger. · A therapist or counselor to help you learn why you eat and teach you ways to deal with stress and your emotions. Your team will also be there to help you prepare for life after surgery. They will help you adjust to new ways of eating and changes to your body. How will weight-loss surgery affect your life? You have likely thought a lot about how surgery may affect your life--how you will eat, how your body will look, or how you will feel. Some people feel overwhelmed with these changes. But planning can help you prepare for the changes and meet your weight-loss goals. One important step in your plan is to learn about the ways surgery will affect your life. These may include:  · A slimmer you.  You probably will lose weight very quickly in the first few months after surgery. As time goes on, your weight loss will slow down. How much weight you lose depends on what type of surgery you had and how well your new eating and activity plans are working for you. · A new way of eating. Success in reaching and keeping a healthy weight depends on making lifelong changes in how you eat. After surgery, you raise your chances of success if you:  ¨ Eat just a few ounces of food at a time. ¨ Eat very slowly and chew your food to mush. ¨ Don't drink for 30 minutes before you eat, during your meal, and for 30 minutes after you eat. ¨ Are careful about drinking alcohol. ¨ Avoid foods that are high in fat or sugar. ¨ Take vitamin and mineral supplements. · A healthier you. Weight-loss surgery can have some real health benefits. Problems like diabetes, high blood pressure, and sleep apnea may go away--or at least become easier to manage. · A more active you. After surgery, being active on most days of the week will help you reach your weight goal and avoid gaining back the weight you lose. · A lot of extra skin. When you lose weight quickly, you may have a lot of extra skin. That's normal. You can have surgery to remove the extra skin if it bothers you. There are going to be some ups and downs while you get used to these changes. So another way to adjust is to identify who can help support you. Getting support from friends and family can help. And joining a support group for people who have had the surgery can be a big help too, because they know what you're going through. As you know, it's a big decision to have weight-loss surgery. But when you have a plan, you can focus on losing weight and living a healthier life. So what steps can you take to prepare for weight-loss surgery? Will you set some goals? Will you learn about how surgery can affect your life? How about asking family or friends for help? Write out your plan.  Then get ready. Where can you learn more? Go to http://britt-gita.info/. Enter Z156 in the search box to learn more about \"Learning About How to Prepare for Weight-Loss Surgery. \"  Current as of: October 13, 2016  Content Version: 11.3  © 4537-4069 [employer]. Care instructions adapted under license by [employer] (which disclaims liability or warranty for this information). If you have questions about a medical condition or this instruction, always ask your healthcare professional. [address] any warranty or liability for your use of this information.

## 2017-09-29 NOTE — PROGRESS NOTES
Subjective: The patient is a 38 y.o. obese male scheduled for sleeve gastrectomy on 10/12. Body mass index is 40.07 kg/(m^2). Alok Zheng has tried multiple diets in his  lifetime most recently trying unsupervised diets during which he was able to lose small amounts of weight. Bariatric comorbidities present are   Past Medical History:   Diagnosis Date    Chronic kidney disease     HEMODIALYSIS    Diabetes ([address]     NO LONGER REQUIRES MEDICATION SINCE DIALYSIS    Hypertension     NO LONGER REQUIRES MEDICATION SINCE DIALYSIS    Morbid obesity ([address]        Patient Active Problem List    Diagnosis Date Noted    Morbid obesity ([address] 02/09/2017    Essential hypertension 02/09/2017    Type 2 diabetes mellitus with kidney complication, without long-term current use of insulin ([address] 02/09/2017    ESRD (end stage renal disease) ([address] 02/09/2017     Past Medical History:   Diagnosis Date    Chronic kidney disease     HEMODIALYSIS    Diabetes ([address]     NO LONGER REQUIRES MEDICATION SINCE DIALYSIS    Hypertension     NO LONGER REQUIRES MEDICATION SINCE DIALYSIS    Morbid obesity ([address]       Past Surgical History:   Procedure Laterality Date    HX LAP CHOLECYSTECTOMY  06/2017    HX OTHER SURGICAL      left A-V graft    HX OTHER SURGICAL  12/2015    Tinkcoff cath for PD, used 2 months then removed      Social History   Substance Use Topics    Smoking status: Never Smoker    Smokeless tobacco: Never Used    Alcohol use No      Family History   Problem Relation Age of Onset    Diabetes Mother     Hypertension Mother     Diabetes Maternal Aunt     Hypertension Maternal Aunt     Diabetes Maternal Grandmother     Hypertension Maternal Grandmother       Prior to Admission medications    Medication Sig Start Date End Date Taking? Authorizing Provider   Omeprazole delayed release (PRILOSEC D/R) 20 mg tablet Take 1 Tab by mouth daily.  Daily after surgery 9/28/17   Darryllamary Regalado NP ondansetron (ZOFRAN ODT) 4 mg disintegrating tablet Take 1 Tab by mouth every eight (8) hours as needed for Nausea. After surgery 9/28/17   Roderick Sánchezr, REYNOLD   hyoscyamine SL (LEVSIN/SL) 0.125 mg SL tablet 1 Tab by SubLINGual route every four (4) hours as needed for Cramping. After surgery as needed for pressure and spasm upper abdomen / chest 9/28/17   Roderick Sánchezr, NP     No Known Allergies        Objective:     Visit Vitals    /90    Pulse 96    Temp 98 °F (36.7 °C)    Resp 20    Ht 5' 5.5\" (1.664 m)    Wt 244 lb 8 oz (110.9 kg)    SpO2 98%    BMI 40.07 kg/m2       Lab Review:    Recent Results (from the past 24 hour(s))   EKG, 12 LEAD, INITIAL    Collection Time: 09/28/17 10:20 AM   Result Value Ref Range    Ventricular Rate 91 BPM    Atrial Rate 91 BPM    P-R Interval 138 ms    QRS Duration 98 ms    Q-T Interval 394 ms    QTC Calculation (Bezet) 484 ms    Calculated P Axis 47 degrees    Calculated R Axis -7 degrees    Calculated T Axis 23 degrees    Diagnosis       Normal sinus rhythm  Minimal voltage criteria for LVH, may be normal variant  Prolonged QT  No previous ECGs available  Confirmed by Antonio Ly MD, Monica Gomez ([id number]) on 9/28/2017 3:58:50 PM     C REACTIVE PROTEIN, QT    Collection Time: 09/28/17 10:27 AM   Result Value Ref Range    C-Reactive protein 0.83 (H) 0.00 - 0.60 mg/dL   CBC WITH AUTOMATED DIFF    Collection Time: 09/28/17 10:27 AM   Result Value Ref Range    WBC 6.8 4.1 - 11.1 K/uL    RBC 3.80 (L) 4.10 - 5.70 M/uL    HGB 11.3 (L) 12.1 - 17.0 g/dL    HCT 37.3 36.6 - 50.3 %    MCV 98.2 80.0 - 99.0 FL    MCH 29.7 26.0 - 34.0 PG    MCHC 30.3 30.0 - 36.5 g/dL    RDW 16.1 (H) 11.5 - 14.5 %    PLATELET 781 977 - 510 K/uL    NEUTROPHILS 53 32 - 75 %    LYMPHOCYTES 37 12 - 49 %    MONOCYTES 7 5 - 13 %    EOSINOPHILS 3 0 - 7 %    BASOPHILS 0 0 - 1 %    ABS. NEUTROPHILS 3.6 1.8 - 8.0 K/UL    ABS. LYMPHOCYTES 2.5 0.8 - 3.5 K/UL    ABS. MONOCYTES 0.5 0.0 - 1.0 K/UL    ABS.  EOSINOPHILS 0.2 0.0 - 0.4 K/UL    ABS. BASOPHILS 0.0 0.0 - 0.1 K/UL   CHOLESTEROL, TOTAL    Collection Time: 09/28/17 10:27 AM   Result Value Ref Range    Cholesterol, total 146 <200 MG/DL   MAGNESIUM    Collection Time: 09/28/17 10:27 AM   Result Value Ref Range    Magnesium 2.0 1.6 - 2.4 mg/dL   METABOLIC PANEL, COMPREHENSIVE    Collection Time: 09/28/17 10:27 AM   Result Value Ref Range    Sodium 138 136 - 145 mmol/L    Potassium 4.6 3.5 - 5.1 mmol/L    Chloride 99 97 - 108 mmol/L    CO2 24 21 - 32 mmol/L    Anion gap 15 5 - 15 mmol/L    Glucose 110 (H) 65 - 100 mg/dL    BUN 55 (H) 6 - 20 MG/DL    Creatinine 10.50 (H) 0.70 - 1.30 MG/DL    BUN/Creatinine ratio 5 (L) 12 - 20      GFR est AA 7 (L) >60 ml/min/1.73m2    GFR est non-AA 5 (L) >60 ml/min/1.73m2    Calcium 7.7 (L) 8.5 - 10.1 MG/DL    Bilirubin, total 0.4 0.2 - 1.0 MG/DL    ALT (SGPT) 16 12 - 78 U/L    AST (SGOT) 7 (L) 15 - 37 U/L    Alk.  phosphatase 127 (H) 45 - 117 U/L    Protein, total 8.5 (H) 6.4 - 8.2 g/dL    Albumin 3.9 3.5 - 5.0 g/dL    Globulin 4.6 (H) 2.0 - 4.0 g/dL    A-G Ratio 0.8 (L) 1.1 - 2.2     T4, FREE    Collection Time: 09/28/17 10:27 AM   Result Value Ref Range    T4, Free 0.9 0.8 - 1.5 NG/DL   TSH 3RD GENERATION    Collection Time: 09/28/17 10:27 AM   Result Value Ref Range    TSH 1.17 0.36 - 3.74 uIU/mL   URINALYSIS W/ REFLEX CULTURE    Collection Time: 09/28/17 10:27 AM   Result Value Ref Range    Color YELLOW/STRAW      Appearance CLOUDY (A) CLEAR      Specific gravity 1.020 1.003 - 1.030      pH (UA) 5.5 5.0 - 8.0      Protein 300 (A) NEG mg/dL    Glucose 250 (A) NEG mg/dL    Ketone NEGATIVE  NEG mg/dL    Bilirubin NEGATIVE  NEG      Blood MODERATE (A) NEG      Urobilinogen 0.2 0.2 - 1.0 EU/dL    Nitrites NEGATIVE  NEG      Leukocyte Esterase SMALL (A) NEG      WBC 0-4 0 - 4 /hpf    RBC 0-5 0 - 5 /hpf    Epithelial cells FEW FEW /lpf    Bacteria NEGATIVE  NEG /hpf    UA:UC IF INDICATED CULTURE NOT INDICATED BY UA RESULT CNI     VITAMIN D, 25 HYDROXY    Collection Time: 09/28/17 10:27 AM   Result Value Ref Range    Vitamin D 25-Hydroxy 10.0 (L) 30 - 100 ng/mL       Assessment:     Morbid obesity; no success with medical management. Plan:     laparoscopic sleeve gastrectomy    All of his questions have been answered and he has signed a consent for this operation following review of the detailed informed consent document. He was counseled on the 2 week preoperative liver shrinking diet. The postoperative dietary changes and discharge information has been given to him in the form of a booklet. Will arrange hemodialysis for 10/13.    25 minutes spent with patient (greater than 50% of time in face to face consultation reviewing potential risks, anticipated hospital course).       Signed By: Dana Flowers MD     September 29, 2017

## 2017-09-29 NOTE — PERIOP NOTES
LABS/EKG/CXR FAXED TO DR. MARTÍNEZ. EMAIL MESSAGE SENT TO Jelena Hale LPN RE: WIRE SEEN ON CXR,  PROLONGED Q-T ON EKG AND MULTIPLE ABNORMALS ON CMP.

## 2017-09-29 NOTE — PROGRESS NOTES
HISTORY OF PRESENT ILLNESS  Alvan Fothergill. is a 38 y.o. male. HPI  Chief Complaint   Patient presents with    Surg H&P     Schedule for Lap sleeve gasrectomy on 1/12/17 by Dr. Federico Chappell. Patient Active Problem List   Diagnosis Code    Morbid obesity ([facility] E66.01    Essential hypertension I10    Type 2 diabetes mellitus with kidney complication, without long-term current use of insulin (HCC) E11.29    ESRD (end stage renal disease) ([facility] N18.6     Past Medical History:   Diagnosis Date    Chronic kidney disease     HEMODIALYSIS    Diabetes ([facility]     NO LONGER REQUIRES MEDICATION SINCE DIALYSIS    Hypertension     NO LONGER REQUIRES MEDICATION SINCE DIALYSIS    Hypovitaminosis D     Morbid obesity (HCC)      Past Surgical History:   Procedure Laterality Date    HX LAP CHOLECYSTECTOMY  06/2017    HX OTHER SURGICAL      left A-V graft    HX OTHER SURGICAL  12/2015    Tinkcoff cath for PD, used 2 months then removed     Social History     Social History    Marital status:      Spouse name: N/A    Number of children: 4    Years of education: N/A     Occupational History    disabled       used to work as fabricator for [employer] History Main Topics    Smoking status: Never Smoker    Smokeless tobacco: Never Used    Alcohol use No    Drug use: No    Sexual activity: Yes     Partners: Female     Other Topics Concern    Not on file     Social History Narrative    In the home with spouse and 4 children (2516 and 3year old twins)    Disability since renal failure and on Dialysis M/W/F       Current Outpatient Prescriptions:     Omeprazole delayed release (PRILOSEC D/R) 20 mg tablet, Take 1 Tab by mouth daily. Daily after surgery, Disp: 30 Tab, Rfl: 1    ondansetron (ZOFRAN ODT) 4 mg disintegrating tablet, Take 1 Tab by mouth every eight (8) hours as needed for Nausea.  After surgery, Disp: 20 Tab, Rfl: 0    hyoscyamine SL (LEVSIN/SL) 0.125 mg SL tablet, 1 Tab by SubLINGual route every four (4) hours as needed for Cramping. After surgery as needed for pressure and spasm upper abdomen / chest, Disp: 30 Tab, Rfl: 0  No Known Allergies    PCP Monster Osman, DO    Renal  Jazmín Solano MD   Review of Systems   Constitutional: Positive for malaise/fatigue. Negative for chills, fever and weight loss. HENT: Negative for congestion, hearing loss and tinnitus. Eyes: Negative for blurred vision. Respiratory: Negative for cough, shortness of breath and wheezing.         - sleep study - [facility]   Cardiovascular: Negative for chest pain, palpitations and leg swelling. Gastrointestinal: Negative for abdominal pain, blood in stool, constipation, diarrhea, heartburn, nausea and vomiting. Genitourinary: Negative for flank pain. Dialysis M/W/F at [address] in [address] [place] [address]   Dr. Jazmín Solano  HD past 15 months   Tried PD and \"didn't work too well\"   Musculoskeletal: Positive for joint pain and myalgias. Negative for falls. Skin: Negative. Neurological: Negative for dizziness, tingling, seizures and loss of consciousness. Endo/Heme/Allergies:        Last HgA1C < 7%   \"no meds since dialysis\"   Psychiatric/Behavioral: The patient does not have insomnia. Wife and cousin had WLS        Physical Exam   Constitutional: He is oriented to person, place, and time. No distress. /90  Pulse (!) 101  Temp 98 °F (36.7 °C) (Oral)   Resp 20  Ht 5' 5.5\" (1.664 m)  Wt 244 lb 8 oz (110.9 kg)  SpO2 98%  BMI 40.07 kg/m2  AA male, unaccompanied    HENT:   Head: Normocephalic. Mouth/Throat: No oropharyngeal exudate. Missing left rear molar    Eyes: Pupils are equal, round, and reactive to light. No scleral icterus. Neck: Normal range of motion. Neck supple. No JVD present. No tracheal deviation present. No thyromegaly present. Cardiovascular: Normal rate and regular rhythm.     Tachycardic   Left UE AVF + bruit    Pulmonary/Chest: Effort normal and breath sounds normal. No respiratory distress. Abdominal: Soft. Bowel sounds are normal. He exhibits no distension. There is no tenderness. Musculoskeletal: Normal range of motion. He exhibits no edema. Lymphadenopathy:     He has no cervical adenopathy. Neurological: He is alert and oriented to person, place, and time. Skin: Skin is warm and dry. He is not diaphoretic. Psychiatric: He has a normal mood and affect. ASSESSMENT and PLAN    ICD-10-CM ICD-9-CM    1. Morbid obesity with BMI of 40.0-44.9, adult ([facility] E66.01 278.01     Z68.41 V85.41    2. ESRD (end stage renal disease) ([facility] N18.6 585.6    3. Essential hypertension I10 401.9      Scheduled for Sleeve gastrectomy for treatment of morbid obesity  on 10/12/17 with Dr. Krystle Sampson on liver shrinking diet  Preadmission labs reviewed   Hypovitaminosis D and he is monitored by renal   Due for dialysis tomorrow   CXR with what appears to be a guide wire in the LUE ? Near site of AVF - will f/u with him as he needs to see his vascular surgeon about this   Reviewed post operative diet, restrictions, follow up and medications  Post operative 2, 4  and 6 week appointments made  Reviewed educational materials and book  Questions answered   Rollen . verbalized understanding and questions were answered to the best of my knowledge and ability. Diet  educational materials were provided.       33 minutes spent in face to face with patient

## 2017-09-29 NOTE — PROGRESS NOTES
DYLON---     MR. TSE'S CXR SHOWS A \"WIRE THAT IS PARTIALLY COILED PROJECTING IN THE PROXIMAL PORTION OF THE LEFT UPPER EXTREMITY\".  iS THAT EXPECTED??     ALSO, MULTIPLE ABNORMALITIES ON LABS ESPECIALLY ON CMP.       EKG SHOWS PROLONGED Q-T. THANKS. Kaylynn Matta, [facility] [place] JOELLE Ding NP aware of abnormal labs and CXR, EKG.

## 2017-10-02 ENCOUNTER — TELEPHONE (OUTPATIENT)
Dept: SURGERY | Age: 42
End: 2017-10-02

## 2017-10-02 NOTE — PERIOP NOTES
SPOKE WITH OVIDIO ROSA WHO IS COVERING  E Katy Guzman IN DR MARTÍNEZ'S OFFICE TO ADVISE THAT PATIENT HAD AN ABNORMAL CHEST XRAY FINDING AND HE HAD MULTIPLE ABNORMAL LABS ON PAT LABS DRAWN 9/28/2017. ALSO ADVISED HER THAT IT APPEARS PATIENT MAY NOT HAVE HAD ABGS DRAWN WHEN HE GOT TO [facility] ON 9/28/2017. SHE WILL LOOK INTO THESE CONCERNS.

## 2017-10-02 NOTE — TELEPHONE ENCOUNTER
Spoke with patient about wire in LUE seen on CXR and he reports \"it is supposed to be there\". His AVF was clotting and it is a coil to try and maintain patency. Aware of labs and he is on hemodialysis. All reviewed and d/w Dr. Nae Tejada and patient.

## 2017-10-02 NOTE — TELEPHONE ENCOUNTER
Spoke with Farhan nurse with [facility] PAT's states patient has abnormal chest xray shows wire in arm and he has multi abnormal labs she faxed over sheets on Friday.   Dr. Nae Tejada nurse was informed of above on 9/29/17 labs and xray reviewed by NP.

## 2017-10-12 ENCOUNTER — ANESTHESIA (OUTPATIENT)
Dept: SURGERY | Age: 42
DRG: 619 | End: 2017-10-12
Payer: COMMERCIAL

## 2017-10-12 ENCOUNTER — ANESTHESIA EVENT (OUTPATIENT)
Dept: SURGERY | Age: 42
DRG: 619 | End: 2017-10-12
Payer: COMMERCIAL

## 2017-10-12 ENCOUNTER — HOSPITAL ENCOUNTER (INPATIENT)
Age: 42
LOS: 2 days | Discharge: HOME | DRG: 619 | End: 2017-10-14
Attending: SURGERY | Admitting: SURGERY
Payer: COMMERCIAL

## 2017-10-12 LAB
ANION GAP BLD CALC-SCNC: 18 MMOL/L (ref 5–15)
BUN BLD-MCNC: 46 MG/DL (ref 9–20)
CA-I BLD-MCNC: 0.99 MMOL/L (ref 1.12–1.32)
CHLORIDE BLD-SCNC: 98 MMOL/L (ref 98–107)
CO2 BLD-SCNC: 29 MMOL/L (ref 21–32)
CREAT BLD-MCNC: 10.3 MG/DL (ref 0.6–1.3)
GLUCOSE BLD STRIP.AUTO-MCNC: 108 MG/DL (ref 65–100)
GLUCOSE BLD-MCNC: 96 MG/DL (ref 65–100)
HCT VFR BLD CALC: 46 % (ref 36.6–50.3)
HGB BLD-MCNC: 15.6 GM/DL (ref 12.1–17)
POTASSIUM BLD-SCNC: 4.9 MMOL/L (ref 3.5–5.1)
SERVICE CMNT-IMP: ABNORMAL
SERVICE CMNT-IMP: ABNORMAL
SODIUM BLD-SCNC: 139 MMOL/L (ref 136–145)

## 2017-10-12 PROCEDURE — 77030010507 HC ADH SKN DERMBND J&J -B: Performed by: SURGERY

## 2017-10-12 PROCEDURE — 77030011640 HC PAD GRND REM COVD -A: Performed by: SURGERY

## 2017-10-12 PROCEDURE — 74011250636 HC RX REV CODE- 250/636

## 2017-10-12 PROCEDURE — 82962 GLUCOSE BLOOD TEST: CPT

## 2017-10-12 PROCEDURE — 77030037367 HC STPLR ENDO TRI-STPLR COVD -D: Performed by: SURGERY

## 2017-10-12 PROCEDURE — 77030008684 HC TU ET CUF COVD -B: Performed by: ANESTHESIOLOGY

## 2017-10-12 PROCEDURE — 80047 BASIC METABLC PNL IONIZED CA: CPT

## 2017-10-12 PROCEDURE — 77030008756 HC TU IRR SUC STRY -B: Performed by: SURGERY

## 2017-10-12 PROCEDURE — 65660000000 HC RM CCU STEPDOWN

## 2017-10-12 PROCEDURE — 77030037032 HC INSRT SCIS CLICKLLINE DISP STOR -B: Performed by: SURGERY

## 2017-10-12 PROCEDURE — 77030035051: Performed by: SURGERY

## 2017-10-12 PROCEDURE — 74011000250 HC RX REV CODE- 250: Performed by: SURGERY

## 2017-10-12 PROCEDURE — 77030032435: Performed by: SURGERY

## 2017-10-12 PROCEDURE — 74011250636 HC RX REV CODE- 250/636: Performed by: SURGERY

## 2017-10-12 PROCEDURE — 77030035042 HC TRCR ENDOSC OPTCL BLDLSS COVD -D: Performed by: SURGERY

## 2017-10-12 PROCEDURE — 88307 TISSUE EXAM BY PATHOLOGIST: CPT | Performed by: SURGERY

## 2017-10-12 PROCEDURE — 0DB64Z3 EXCISION OF STOMACH, PERCUTANEOUS ENDOSCOPIC APPROACH, VERTICAL: ICD-10-PCS | Performed by: SURGERY

## 2017-10-12 PROCEDURE — 77030002895 HC DEV VASC CLOSR COVD -B: Performed by: SURGERY

## 2017-10-12 PROCEDURE — 74011000258 HC RX REV CODE- 258: Performed by: ANESTHESIOLOGY

## 2017-10-12 PROCEDURE — 74011250636 HC RX REV CODE- 250/636: Performed by: ANESTHESIOLOGY

## 2017-10-12 PROCEDURE — 77030002916 HC SUT ETHLN J&J -A: Performed by: SURGERY

## 2017-10-12 PROCEDURE — 77030011992 HC AIRWY NASOPHGL TELE -A: Performed by: ANESTHESIOLOGY

## 2017-10-12 PROCEDURE — 77030020263 HC SOL INJ SOD CL0.9% LFCR 1000ML: Performed by: SURGERY

## 2017-10-12 PROCEDURE — 77030009965 HC RELD STPLR ENDOS COVD -D: Performed by: SURGERY

## 2017-10-12 PROCEDURE — 77030012407 HC DRN WND BARD -B: Performed by: SURGERY

## 2017-10-12 PROCEDURE — 77030026438 HC STYL ET INTUB CARD -A: Performed by: ANESTHESIOLOGY

## 2017-10-12 PROCEDURE — 77030034208 HC SLV GASTRCTMY 3D CAL SYS DISP BOEH -C: Performed by: SURGERY

## 2017-10-12 PROCEDURE — 76060000034 HC ANESTHESIA 1.5 TO 2 HR: Performed by: SURGERY

## 2017-10-12 PROCEDURE — 76010000153 HC OR TIME 1.5 TO 2 HR: Performed by: SURGERY

## 2017-10-12 PROCEDURE — 74011000250 HC RX REV CODE- 250

## 2017-10-12 PROCEDURE — 77030020782 HC GWN BAIR PAWS FLX 3M -B

## 2017-10-12 PROCEDURE — 77030018836 HC SOL IRR NACL ICUM -A: Performed by: SURGERY

## 2017-10-12 PROCEDURE — 0DJ08ZZ INSPECTION OF UPPER INTESTINAL TRACT, VIA NATURAL OR ARTIFICIAL OPENING ENDOSCOPIC: ICD-10-PCS | Performed by: SURGERY

## 2017-10-12 PROCEDURE — 77030002933 HC SUT MCRYL J&J -A: Performed by: SURGERY

## 2017-10-12 PROCEDURE — 74011250637 HC RX REV CODE- 250/637: Performed by: SURGERY

## 2017-10-12 PROCEDURE — 77030016151 HC PROTCTR LNS DFOG COVD -B: Performed by: SURGERY

## 2017-10-12 PROCEDURE — 76210000000 HC OR PH I REC 2 TO 2.5 HR: Performed by: SURGERY

## 2017-10-12 PROCEDURE — 77030034699 HC LIGASURE MRYLND LAP SEAL DIV COVD -F: Performed by: SURGERY

## 2017-10-12 PROCEDURE — 77030031139 HC SUT VCRL2 J&J -A: Performed by: SURGERY

## 2017-10-12 PROCEDURE — 77030035048 HC TRCR ENDOSC OPTCL COVD -B: Performed by: SURGERY

## 2017-10-12 PROCEDURE — 77030008437 HC REINF STRP REINF SEMGD WLGO -C: Performed by: SURGERY

## 2017-10-12 PROCEDURE — 77030013567 HC DRN WND RESERV BARD -A: Performed by: SURGERY

## 2017-10-12 PROCEDURE — 77030018846 HC SOL IRR STRL H20 ICUM -A: Performed by: SURGERY

## 2017-10-12 PROCEDURE — 77030032490 HC SLV COMPR SCD KNE COVD -B: Performed by: SURGERY

## 2017-10-12 PROCEDURE — 77030020747 HC TU INSUF ENDOSC TELE -A: Performed by: SURGERY

## 2017-10-12 DEVICE — STAPLER INT 60 UNIV BLK W TRI STAPLE TECHNOLOGY ULT FOR 4: Type: IMPLANTABLE DEVICE | Site: STOMACH | Status: FUNCTIONAL

## 2017-10-12 DEVICE — STAPLER INT 60 UNIV PUR W/ TRI-STAPLE TECHNOLOGY ULT FOR: Type: IMPLANTABLE DEVICE | Site: STOMACH | Status: FUNCTIONAL

## 2017-10-12 RX ORDER — ONDANSETRON 2 MG/ML
INJECTION INTRAMUSCULAR; INTRAVENOUS AS NEEDED
Status: DISCONTINUED | OUTPATIENT
Start: 2017-10-12 | End: 2017-10-12 | Stop reason: HOSPADM

## 2017-10-12 RX ORDER — LORAZEPAM 2 MG/ML
1 INJECTION INTRAMUSCULAR
Status: DISCONTINUED | OUTPATIENT
Start: 2017-10-12 | End: 2017-10-14 | Stop reason: HOSPADM

## 2017-10-12 RX ORDER — GLYCOPYRROLATE 0.2 MG/ML
INJECTION INTRAMUSCULAR; INTRAVENOUS AS NEEDED
Status: DISCONTINUED | OUTPATIENT
Start: 2017-10-12 | End: 2017-10-12 | Stop reason: HOSPADM

## 2017-10-12 RX ORDER — ROPIVACAINE HYDROCHLORIDE 5 MG/ML
30 INJECTION, SOLUTION EPIDURAL; INFILTRATION; PERINEURAL AS NEEDED
Status: DISCONTINUED | OUTPATIENT
Start: 2017-10-12 | End: 2017-10-12 | Stop reason: HOSPADM

## 2017-10-12 RX ORDER — OXYCODONE HYDROCHLORIDE 5 MG/1
5 TABLET ORAL AS NEEDED
Status: DISCONTINUED | OUTPATIENT
Start: 2017-10-12 | End: 2017-10-12 | Stop reason: HOSPADM

## 2017-10-12 RX ORDER — HEPARIN SODIUM 5000 [USP'U]/ML
5000 INJECTION, SOLUTION INTRAVENOUS; SUBCUTANEOUS
Status: COMPLETED | OUTPATIENT
Start: 2017-10-12 | End: 2017-10-12

## 2017-10-12 RX ORDER — CEFAZOLIN SODIUM IN 0.9 % NACL 2 G/50 ML
2 INTRAVENOUS SOLUTION, PIGGYBACK (ML) INTRAVENOUS
Status: COMPLETED | OUTPATIENT
Start: 2017-10-12 | End: 2017-10-12

## 2017-10-12 RX ORDER — DEXAMETHASONE SODIUM PHOSPHATE 4 MG/ML
INJECTION, SOLUTION INTRA-ARTICULAR; INTRALESIONAL; INTRAMUSCULAR; INTRAVENOUS; SOFT TISSUE AS NEEDED
Status: DISCONTINUED | OUTPATIENT
Start: 2017-10-12 | End: 2017-10-12 | Stop reason: HOSPADM

## 2017-10-12 RX ORDER — LIDOCAINE HYDROCHLORIDE 20 MG/ML
INJECTION, SOLUTION EPIDURAL; INFILTRATION; INTRACAUDAL; PERINEURAL AS NEEDED
Status: DISCONTINUED | OUTPATIENT
Start: 2017-10-12 | End: 2017-10-12 | Stop reason: HOSPADM

## 2017-10-12 RX ORDER — MORPHINE SULFATE 10 MG/ML
2 INJECTION, SOLUTION INTRAMUSCULAR; INTRAVENOUS
Status: DISCONTINUED | OUTPATIENT
Start: 2017-10-12 | End: 2017-10-12 | Stop reason: HOSPADM

## 2017-10-12 RX ORDER — FENTANYL CITRATE 50 UG/ML
50 INJECTION, SOLUTION INTRAMUSCULAR; INTRAVENOUS AS NEEDED
Status: DISCONTINUED | OUTPATIENT
Start: 2017-10-12 | End: 2017-10-12 | Stop reason: HOSPADM

## 2017-10-12 RX ORDER — MIDAZOLAM HYDROCHLORIDE 1 MG/ML
1 INJECTION, SOLUTION INTRAMUSCULAR; INTRAVENOUS AS NEEDED
Status: DISCONTINUED | OUTPATIENT
Start: 2017-10-12 | End: 2017-10-12 | Stop reason: HOSPADM

## 2017-10-12 RX ORDER — SODIUM CHLORIDE, SODIUM LACTATE, POTASSIUM CHLORIDE, CALCIUM CHLORIDE 600; 310; 30; 20 MG/100ML; MG/100ML; MG/100ML; MG/100ML
25 INJECTION, SOLUTION INTRAVENOUS CONTINUOUS
Status: DISCONTINUED | OUTPATIENT
Start: 2017-10-12 | End: 2017-10-12 | Stop reason: HOSPADM

## 2017-10-12 RX ORDER — HYDROMORPHONE HYDROCHLORIDE 1 MG/ML
1 INJECTION, SOLUTION INTRAMUSCULAR; INTRAVENOUS; SUBCUTANEOUS
Status: DISCONTINUED | OUTPATIENT
Start: 2017-10-12 | End: 2017-10-14 | Stop reason: HOSPADM

## 2017-10-12 RX ORDER — PHENYLEPHRINE HCL IN 0.9% NACL 0.4MG/10ML
SYRINGE (ML) INTRAVENOUS AS NEEDED
Status: DISCONTINUED | OUTPATIENT
Start: 2017-10-12 | End: 2017-10-12 | Stop reason: HOSPADM

## 2017-10-12 RX ORDER — HYDROMORPHONE HYDROCHLORIDE 1 MG/ML
0.2 INJECTION, SOLUTION INTRAMUSCULAR; INTRAVENOUS; SUBCUTANEOUS
Status: DISCONTINUED | OUTPATIENT
Start: 2017-10-12 | End: 2017-10-12 | Stop reason: HOSPADM

## 2017-10-12 RX ORDER — DIPHENHYDRAMINE HYDROCHLORIDE 50 MG/ML
12.5 INJECTION, SOLUTION INTRAMUSCULAR; INTRAVENOUS AS NEEDED
Status: DISCONTINUED | OUTPATIENT
Start: 2017-10-12 | End: 2017-10-12 | Stop reason: HOSPADM

## 2017-10-12 RX ORDER — DIPHENHYDRAMINE HYDROCHLORIDE 50 MG/ML
25 INJECTION, SOLUTION INTRAMUSCULAR; INTRAVENOUS
Status: ACTIVE | OUTPATIENT
Start: 2017-10-12 | End: 2017-10-13

## 2017-10-12 RX ORDER — MIDAZOLAM HYDROCHLORIDE 1 MG/ML
0.5 INJECTION, SOLUTION INTRAMUSCULAR; INTRAVENOUS
Status: DISCONTINUED | OUTPATIENT
Start: 2017-10-12 | End: 2017-10-12 | Stop reason: HOSPADM

## 2017-10-12 RX ORDER — KETOROLAC TROMETHAMINE 30 MG/ML
15 INJECTION, SOLUTION INTRAMUSCULAR; INTRAVENOUS EVERY 6 HOURS
Status: DISCONTINUED | OUTPATIENT
Start: 2017-10-12 | End: 2017-10-12

## 2017-10-12 RX ORDER — FENTANYL CITRATE 50 UG/ML
25 INJECTION, SOLUTION INTRAMUSCULAR; INTRAVENOUS
Status: COMPLETED | OUTPATIENT
Start: 2017-10-12 | End: 2017-10-12

## 2017-10-12 RX ORDER — SODIUM CHLORIDE, SODIUM LACTATE, POTASSIUM CHLORIDE, CALCIUM CHLORIDE 600; 310; 30; 20 MG/100ML; MG/100ML; MG/100ML; MG/100ML
100 INJECTION, SOLUTION INTRAVENOUS CONTINUOUS
Status: DISCONTINUED | OUTPATIENT
Start: 2017-10-12 | End: 2017-10-12 | Stop reason: HOSPADM

## 2017-10-12 RX ORDER — ROCURONIUM BROMIDE 10 MG/ML
INJECTION, SOLUTION INTRAVENOUS AS NEEDED
Status: DISCONTINUED | OUTPATIENT
Start: 2017-10-12 | End: 2017-10-12 | Stop reason: HOSPADM

## 2017-10-12 RX ORDER — SODIUM CHLORIDE 0.9 % (FLUSH) 0.9 %
5-10 SYRINGE (ML) INJECTION AS NEEDED
Status: DISCONTINUED | OUTPATIENT
Start: 2017-10-12 | End: 2017-10-12 | Stop reason: HOSPADM

## 2017-10-12 RX ORDER — MIDAZOLAM HYDROCHLORIDE 1 MG/ML
INJECTION, SOLUTION INTRAMUSCULAR; INTRAVENOUS AS NEEDED
Status: DISCONTINUED | OUTPATIENT
Start: 2017-10-12 | End: 2017-10-12 | Stop reason: HOSPADM

## 2017-10-12 RX ORDER — ONDANSETRON 2 MG/ML
4 INJECTION INTRAMUSCULAR; INTRAVENOUS AS NEEDED
Status: DISCONTINUED | OUTPATIENT
Start: 2017-10-12 | End: 2017-10-12 | Stop reason: HOSPADM

## 2017-10-12 RX ORDER — NEOSTIGMINE METHYLSULFATE 1 MG/ML
INJECTION INTRAVENOUS AS NEEDED
Status: DISCONTINUED | OUTPATIENT
Start: 2017-10-12 | End: 2017-10-12 | Stop reason: HOSPADM

## 2017-10-12 RX ORDER — HYOSCYAMINE SULFATE 0.12 MG/ML
125 LIQUID ORAL
Status: DISCONTINUED | OUTPATIENT
Start: 2017-10-12 | End: 2017-10-14 | Stop reason: HOSPADM

## 2017-10-12 RX ORDER — HEPARIN SODIUM 5000 [USP'U]/ML
5000 INJECTION, SOLUTION INTRAVENOUS; SUBCUTANEOUS EVERY 8 HOURS
Status: DISCONTINUED | OUTPATIENT
Start: 2017-10-12 | End: 2017-10-14 | Stop reason: HOSPADM

## 2017-10-12 RX ORDER — NALOXONE HYDROCHLORIDE 0.4 MG/ML
0.4 INJECTION, SOLUTION INTRAMUSCULAR; INTRAVENOUS; SUBCUTANEOUS AS NEEDED
Status: DISCONTINUED | OUTPATIENT
Start: 2017-10-12 | End: 2017-10-14 | Stop reason: HOSPADM

## 2017-10-12 RX ORDER — SODIUM CHLORIDE 0.9 % (FLUSH) 0.9 %
5-10 SYRINGE (ML) INJECTION AS NEEDED
Status: DISCONTINUED | OUTPATIENT
Start: 2017-10-12 | End: 2017-10-14 | Stop reason: HOSPADM

## 2017-10-12 RX ORDER — SODIUM CHLORIDE 9 MG/ML
50 INJECTION, SOLUTION INTRAVENOUS CONTINUOUS
Status: DISPENSED | OUTPATIENT
Start: 2017-10-12 | End: 2017-10-13

## 2017-10-12 RX ORDER — ACETAMINOPHEN 10 MG/ML
1000 INJECTION, SOLUTION INTRAVENOUS EVERY 8 HOURS
Status: COMPLETED | OUTPATIENT
Start: 2017-10-12 | End: 2017-10-13

## 2017-10-12 RX ORDER — LIDOCAINE HYDROCHLORIDE 10 MG/ML
0.1 INJECTION, SOLUTION EPIDURAL; INFILTRATION; INTRACAUDAL; PERINEURAL AS NEEDED
Status: DISCONTINUED | OUTPATIENT
Start: 2017-10-12 | End: 2017-10-12 | Stop reason: HOSPADM

## 2017-10-12 RX ORDER — PROPOFOL 10 MG/ML
INJECTION, EMULSION INTRAVENOUS AS NEEDED
Status: DISCONTINUED | OUTPATIENT
Start: 2017-10-12 | End: 2017-10-12 | Stop reason: HOSPADM

## 2017-10-12 RX ORDER — FENTANYL CITRATE 50 UG/ML
INJECTION, SOLUTION INTRAMUSCULAR; INTRAVENOUS AS NEEDED
Status: DISCONTINUED | OUTPATIENT
Start: 2017-10-12 | End: 2017-10-12 | Stop reason: HOSPADM

## 2017-10-12 RX ORDER — SODIUM CHLORIDE 0.9 % (FLUSH) 0.9 %
5-10 SYRINGE (ML) INJECTION EVERY 8 HOURS
Status: DISCONTINUED | OUTPATIENT
Start: 2017-10-12 | End: 2017-10-14 | Stop reason: HOSPADM

## 2017-10-12 RX ORDER — ONDANSETRON 2 MG/ML
4 INJECTION INTRAMUSCULAR; INTRAVENOUS
Status: DISCONTINUED | OUTPATIENT
Start: 2017-10-12 | End: 2017-10-14 | Stop reason: HOSPADM

## 2017-10-12 RX ORDER — SODIUM CHLORIDE 9 MG/ML
25 INJECTION, SOLUTION INTRAVENOUS CONTINUOUS
Status: DISCONTINUED | OUTPATIENT
Start: 2017-10-12 | End: 2017-10-12 | Stop reason: HOSPADM

## 2017-10-12 RX ORDER — SCOLOPAMINE TRANSDERMAL SYSTEM 1 MG/1
1.5 PATCH, EXTENDED RELEASE TRANSDERMAL
Status: DISCONTINUED | OUTPATIENT
Start: 2017-10-12 | End: 2017-10-14 | Stop reason: HOSPADM

## 2017-10-12 RX ORDER — SODIUM CHLORIDE 0.9 % (FLUSH) 0.9 %
5-10 SYRINGE (ML) INJECTION EVERY 8 HOURS
Status: DISCONTINUED | OUTPATIENT
Start: 2017-10-12 | End: 2017-10-12 | Stop reason: HOSPADM

## 2017-10-12 RX ORDER — BUPIVACAINE HYDROCHLORIDE 5 MG/ML
50 INJECTION, SOLUTION EPIDURAL; INTRACAUDAL ONCE
Status: COMPLETED | OUTPATIENT
Start: 2017-10-12 | End: 2017-10-12

## 2017-10-12 RX ADMIN — LIDOCAINE HYDROCHLORIDE 100 MG: 20 INJECTION, SOLUTION EPIDURAL; INFILTRATION; INTRACAUDAL; PERINEURAL at 08:55

## 2017-10-12 RX ADMIN — CEFAZOLIN 2 G: 1 INJECTION, POWDER, FOR SOLUTION INTRAMUSCULAR; INTRAVENOUS; PARENTERAL at 09:12

## 2017-10-12 RX ADMIN — DEXAMETHASONE SODIUM PHOSPHATE 4 MG: 4 INJECTION, SOLUTION INTRA-ARTICULAR; INTRALESIONAL; INTRAMUSCULAR; INTRAVENOUS; SOFT TISSUE at 09:03

## 2017-10-12 RX ADMIN — Medication 10 ML: at 22:00

## 2017-10-12 RX ADMIN — FENTANYL CITRATE 25 MCG: 50 INJECTION, SOLUTION INTRAMUSCULAR; INTRAVENOUS at 11:26

## 2017-10-12 RX ADMIN — Medication 80 MCG: at 09:25

## 2017-10-12 RX ADMIN — ONDANSETRON 4 MG: 2 INJECTION INTRAMUSCULAR; INTRAVENOUS at 09:58

## 2017-10-12 RX ADMIN — HEPARIN SODIUM 5000 UNITS: 5000 INJECTION, SOLUTION INTRAVENOUS; SUBCUTANEOUS at 14:12

## 2017-10-12 RX ADMIN — HYDROMORPHONE HYDROCHLORIDE 1 MG: 1 INJECTION, SOLUTION INTRAMUSCULAR; INTRAVENOUS; SUBCUTANEOUS at 13:50

## 2017-10-12 RX ADMIN — SODIUM CHLORIDE: 900 INJECTION, SOLUTION INTRAVENOUS at 08:44

## 2017-10-12 RX ADMIN — ONDANSETRON 4 MG: 2 INJECTION INTRAMUSCULAR; INTRAVENOUS at 13:24

## 2017-10-12 RX ADMIN — HYOSCYAMINE SULFATE 125 MCG: 0.12 SOLUTION/ DROPS ORAL at 11:41

## 2017-10-12 RX ADMIN — NEOSTIGMINE METHYLSULFATE 3.5 MG: 1 INJECTION INTRAVENOUS at 09:58

## 2017-10-12 RX ADMIN — ROCURONIUM BROMIDE 10 MG: 10 INJECTION, SOLUTION INTRAVENOUS at 08:55

## 2017-10-12 RX ADMIN — Medication 80 MCG: at 09:41

## 2017-10-12 RX ADMIN — FENTANYL CITRATE 25 MCG: 50 INJECTION, SOLUTION INTRAMUSCULAR; INTRAVENOUS at 10:55

## 2017-10-12 RX ADMIN — ACETAMINOPHEN 1000 MG: 10 INJECTION, SOLUTION INTRAVENOUS at 19:08

## 2017-10-12 RX ADMIN — MEPERIDINE HYDROCHLORIDE 12.5 MG: 25 INJECTION INTRAMUSCULAR; INTRAVENOUS; SUBCUTANEOUS at 11:42

## 2017-10-12 RX ADMIN — GLYCOPYRROLATE 0.5 MG: 0.2 INJECTION INTRAMUSCULAR; INTRAVENOUS at 09:58

## 2017-10-12 RX ADMIN — FENTANYL CITRATE 100 MCG: 50 INJECTION, SOLUTION INTRAMUSCULAR; INTRAVENOUS at 08:55

## 2017-10-12 RX ADMIN — Medication 80 MCG: at 09:33

## 2017-10-12 RX ADMIN — ACETAMINOPHEN 1000 MG: 10 INJECTION, SOLUTION INTRAVENOUS at 10:54

## 2017-10-12 RX ADMIN — MIDAZOLAM HYDROCHLORIDE 1 MG: 1 INJECTION, SOLUTION INTRAMUSCULAR; INTRAVENOUS at 08:51

## 2017-10-12 RX ADMIN — PROPOFOL 130 MG: 10 INJECTION, EMULSION INTRAVENOUS at 08:55

## 2017-10-12 RX ADMIN — ROCURONIUM BROMIDE 10 MG: 10 INJECTION, SOLUTION INTRAVENOUS at 09:09

## 2017-10-12 RX ADMIN — FENTANYL CITRATE 50 MCG: 50 INJECTION, SOLUTION INTRAMUSCULAR; INTRAVENOUS at 09:25

## 2017-10-12 RX ADMIN — Medication 80 MCG: at 09:13

## 2017-10-12 RX ADMIN — NEOSTIGMINE METHYLSULFATE 1.5 MG: 1 INJECTION INTRAVENOUS at 10:14

## 2017-10-12 RX ADMIN — MEPERIDINE HYDROCHLORIDE 12.5 MG: 25 INJECTION INTRAMUSCULAR; INTRAVENOUS; SUBCUTANEOUS at 11:32

## 2017-10-12 RX ADMIN — FENTANYL CITRATE 25 MCG: 50 INJECTION, SOLUTION INTRAMUSCULAR; INTRAVENOUS at 10:46

## 2017-10-12 RX ADMIN — GLYCOPYRROLATE 0.3 MG: 0.2 INJECTION INTRAMUSCULAR; INTRAVENOUS at 10:14

## 2017-10-12 RX ADMIN — HEPARIN SODIUM 5000 UNITS: 5000 INJECTION, SOLUTION INTRAVENOUS; SUBCUTANEOUS at 23:06

## 2017-10-12 RX ADMIN — FENTANYL CITRATE 25 MCG: 50 INJECTION, SOLUTION INTRAMUSCULAR; INTRAVENOUS at 11:37

## 2017-10-12 RX ADMIN — SODIUM CHLORIDE 50 ML/HR: 900 INJECTION, SOLUTION INTRAVENOUS at 12:03

## 2017-10-12 RX ADMIN — HEPARIN SODIUM 5000 UNITS: 5000 INJECTION, SOLUTION INTRAVENOUS; SUBCUTANEOUS at 08:04

## 2017-10-12 RX ADMIN — Medication 80 MCG: at 09:20

## 2017-10-12 RX ADMIN — Medication 80 MCG: at 09:11

## 2017-10-12 RX ADMIN — MIDAZOLAM HYDROCHLORIDE 1 MG: 1 INJECTION, SOLUTION INTRAMUSCULAR; INTRAVENOUS at 08:46

## 2017-10-12 RX ADMIN — HYDROMORPHONE HYDROCHLORIDE 1 MG: 1 INJECTION, SOLUTION INTRAMUSCULAR; INTRAVENOUS; SUBCUTANEOUS at 23:06

## 2017-10-12 NOTE — PROGRESS NOTES
TRANSFER - IN REPORT:    Verbal report received from [facility] (name) on Reita Read.  being received from PACU (unit) for routine post - op      Report consisted of patients Situation, Background, Assessment and   Recommendations(SBAR). Information from the following report(s) SBAR, Kardex, Intake/Output and MAR was reviewed with the receiving nurse. Opportunity for questions and clarification was provided. Assessment completed upon patients arrival to unit and care assumed.

## 2017-10-12 NOTE — ANESTHESIA PREPROCEDURE EVALUATION
Anesthetic History   No history of anesthetic complications            Review of Systems / Medical History  Patient summary reviewed, nursing notes reviewed and pertinent labs reviewed    Pulmonary  Within defined limits                 Neuro/Psych   Within defined limits           Cardiovascular    Hypertension              Exercise tolerance: >4 METS     GI/Hepatic/Renal         Renal disease: ESRD and dialysis       Endo/Other    Diabetes    Morbid obesity     Other Findings            Physical Exam    Airway  Mallampati: III  TM Distance: > 6 cm  Neck ROM: normal range of motion, short neck   Mouth opening: Normal     Cardiovascular    Rhythm: regular  Rate: normal         Dental    Dentition: Lower dentition intact and Upper dentition intact     Pulmonary  Breath sounds clear to auscultation               Abdominal  GI exam deferred       Other Findings            Anesthetic Plan    ASA: 3  Anesthesia type: general          Induction: Intravenous  Anesthetic plan and risks discussed with: Patient      CMA

## 2017-10-12 NOTE — IP AVS SNAPSHOT
[address] 
[phone] Patient: Eliseo Caro MRN: [id number] CBB:[id number] Current Discharge Medication List  
  
START taking these medications Dose & Instructions Dispensing Information Comments Morning Noon Evening Bedtime HYDROmorphone 2 mg tablet Commonly known as:  DILAUDID Your last dose was: Your next dose is:    
   
   
 Dose:  2-4 mg Take 1-2 Tabs by mouth every four (4) hours as needed. Max Daily Amount: 24 mg. Quantity:  30 Tab Refills:  0 CONTINUE these medications which have NOT CHANGED Dose & Instructions Dispensing Information Comments Morning Noon Evening Bedtime AURYXIA 210 mg iron tablet Generic drug:  ferric citrate Your last dose was: Your next dose is: Take  by mouth three (3) times daily (with meals). AND 2 TABLETS WITH SNACKS Refills:  0  
     
   
   
   
  
 hyoscyamine SL 0.125 mg SL tablet Commonly known as:  LEVSIN/SL Your last dose was: Your next dose is:    
   
   
 Dose:  0.125 mg  
1 Tab by SubLINGual route every four (4) hours as needed for Cramping. After surgery as needed for pressure and spasm upper abdomen / chest  
 Quantity:  30 Tab Refills:  0 Omeprazole delayed release 20 mg tablet Commonly known as:  PRILOSEC D/R Your last dose was: Your next dose is:    
   
   
 Dose:  20 mg Take 1 Tab by mouth daily. Daily after surgery Quantity:  30 Tab Refills:  1  
     
   
   
   
  
 ondansetron 4 mg disintegrating tablet Commonly known as:  ZOFRAN ODT Your last dose was: Your next dose is:    
   
   
 Dose:  4 mg Take 1 Tab by mouth every eight (8) hours as needed for Nausea. After surgery Quantity:  20 Tab Refills:  0 Where to Get Your Medications Information on where to get these meds will be given to you by the nurse or doctor. ! Ask your nurse or doctor about these medications HYDROmorphone 2 mg tablet

## 2017-10-12 NOTE — PERIOP NOTES
TRANSFER - OUT REPORT:    Verbal report given to Raulito(name) on [address] .  being transferred to [facility](unit) for routine progression of care       Report consisted of patients Situation, Background, Assessment and   Recommendations(SBAR). Time Pre op antibiotic [id number]  Anesthesia Stop time: 4569  Lopez Present on Transfer to floor:n  Order for Lopez on Chart:n    Information from the following report(s) SBAR, OR Summary, Intake/Output and MAR was reviewed with the receiving nurse. Opportunity for questions and clarification was provided. Is the patient on 02? YES       L/Min 2       Other 0    Is the patient on a monitor? NO    Is the nurse transporting with the patient? NO    Surgical Waiting Area notified of patient's transfer from PACU?  YES      The following personal items collected during your admission accompanied patient upon transfer:   Dental Appliance: Dental Appliances: None  Vision:    Hearing Aid:    Jewelry:    Clothing:    Other Valuables:    Valuables sent to safe:

## 2017-10-12 NOTE — IP AVS SNAPSHOT
[address] 
[phone] Patient: Pavan Lopez. MRN: [id number]:[id number] You are allergic to the following No active allergies Recent Documentation Height Weight BMI Smoking Status 1.664 m 102.5 kg 37.03 kg/m2 Never Smoker Emergency Contacts Name Discharge Info Relation Home Work Mobile [address] [facility] CAREGIVER [3] Spouse [3] [phone] About your hospitalization You were admitted on:  October 12, 2017 You last received care in the:  [facility] 4 SURG/BARIATRICS You were discharged on:  October 14, 2017 Unit phone number:  [phone] Why you were hospitalized Your primary diagnosis was:  Not on File Your diagnoses also included: Morbid Obesity (Hcc) Providers Seen During Your Hospitalizations Provider Role Specialty Primary office phone Pavan Rodriguez MD Attending Provider General Surgery [phone] Your Primary Care Physician (PCP) Primary Care Physician Office Phone Office Fax Jesús Britton [phone] Follow-up Information Follow up With Details Comments Contact Info [address] [place] [address] [address] [place] [address] [phone] Asya Rose NP On 10/26/2017 10 am [address] 
[phone] Your Appointments Thursday October 26, 2017 10:00 AM EDT  
POST OP 10 MIN with Asya Rose NP  
[facility] 22 764 ([address]) 217 [facility] 63 [facility] [phone]  
[phone] Thursday November 09, 2017 10:20 AM EST  
POST OP 10 MIN with Asya Rose NP  
[facility] 22 844 ([address]) 217 [facility] 63 [facility] 7 [phone]  
[phone] Tuesday November 28, 2017 10:00 AM EST POST OP 10 MIN with Shane Serra NP  
[facility] 22 592 ([facility]) 217 [address] Kiersten Lee [address] [phone] Current Discharge Medication List  
  
START taking these medications Dose & Instructions Dispensing Information Comments Morning Noon Evening Bedtime HYDROmorphone 2 mg tablet Commonly known as:  DILAUDID Your last dose was: Your next dose is:    
   
   
 Dose:  2-4 mg Take 1-2 Tabs by mouth every four (4) hours as needed. Max Daily Amount: 24 mg. Quantity:  30 Tab Refills:  0 CONTINUE these medications which have NOT CHANGED Dose & Instructions Dispensing Information Comments Morning Noon Evening Bedtime AURYXIA 210 mg iron tablet Generic drug:  ferric citrate Your last dose was: Your next dose is: Take  by mouth three (3) times daily (with meals). AND 2 TABLETS WITH SNACKS Refills:  0  
     
   
   
   
  
 hyoscyamine SL 0.125 mg SL tablet Commonly known as:  LEVSIN/SL Your last dose was: Your next dose is:    
   
   
 Dose:  0.125 mg  
1 Tab by SubLINGual route every four (4) hours as needed for Cramping. After surgery as needed for pressure and spasm upper abdomen / chest  
 Quantity:  30 Tab Refills:  0 Omeprazole delayed release 20 mg tablet Commonly known as:  PRILOSEC D/R Your last dose was: Your next dose is:    
   
   
 Dose:  20 mg Take 1 Tab by mouth daily. Daily after surgery Quantity:  30 Tab Refills:  1  
     
   
   
   
  
 ondansetron 4 mg disintegrating tablet Commonly known as:  ZOFRAN ODT Your last dose was: Your next dose is:    
   
   
 Dose:  4 mg Take 1 Tab by mouth every eight (8) hours as needed for Nausea. After surgery Quantity:  20 Tab Refills:  0 Where to Get Your Medications Information on where to get these meds will be given to you by the nurse or doctor. ! Ask your nurse or doctor about these medications HYDROmorphone 2 mg tablet Discharge Instructions Sleeve Gastrectomy Patient Discharge Instructions General Surgery -[address] [place] [address] 
[phone] 1. Activities: You may be active walking, stair climbing, and doing light weight activities with one to two-pound weights, sitting in a chair using different arm motions. Major restrictions include driving until your first office visit and lifting anything heavier than ten pounds. It is very important that you walk frequently. In addition to walking, you should continue to use your incentive spirometer (breathing exercises) throughout the day. 2. Caring for vour incision (s}: Your surgical incision(s) will be covered with Derma bond (super glue). You may shower as desired and simply pat dry the area over the incision(s). There may occasionally be seepage of yellowish to yellowish-maroon dissolved fat from your wound, which is of no major concern as long as the wound is not red, hardened in the area of the drainage, or if the drainage has a foul smell. If there are any questions, call our office for further instructions. If there is this type of dissolved fat drainage,  the shower and gently express the fluid from your wound. Then keep gauze pads over the area to protect both the wound and your clothes. 3. When to call the office immediately: 
 
 
*Chest pain (not associated with eating/drinking) *Shortness of breath (more than normal) *Sudden pain and/or redness in calf *Fever greater than 101F 
*Persistent nausea and/or vomiting (unable to keep down any liquids) *Bleeding from incision(s) *Severe abdominal pain *Any other concerning symptoms 4.  Diet: There are three main priorities as far as your diet is concerned--- 
 
 a. Clear Liquids-drink from 1 oz. cups or standard shot glass. These liquids are non-carbonated, no added sugar, and not irritating to your stomach. They may include water, tea, coffee\" 100% no added sugar juices (avoid citrus) , clear broth, blenderized clear soups such as [employer] Healthy Request Chicken Noodle and Chicken & Rice, Sugar-free products including popsicles, Luis Angel-Aid, and Crystal Lite. b. Vitamins: Multi-vitamin with iron in the morning and evening, making sure 
it's not the first thing or the last thing taken. The other Vitamin B-12 and 
vitamin  D capsule may be taken once during the day anytime that you 
can make a routine of it. 
 
c. Protein Intake: During your initial two-three weeks when your body is switching from burning glucose to directly burning fat, there is an attempt by your body to use protein as a fuel. To protect that protein, we like you to exercise as listed above, and to try to take in 50-60 grams of protein per day. This can be done with four 8 oz. serving of skim milk and Low Carb [place] Instant Breakfast. Each 8 oz. should be considered a meal-breakfast, lunch, or supper, and during this mealtime it should be the sole ingested substance. Stop your clear liquids for 20 minutes before your start on the instant breakfast, which is sipped 1 oz. at a time. You may also try the following substitute for [place] Instant Breakfast: skim milk-fat free powdered milk + Egg Beaters + flavor extract. If desired, ice may be added and blenderized in the . If the milk upsets your stomach, you may purchase Lactaid tablets from any drug store. Lactaid skim milk may be purchased at most major supermarkets. Another alternative is Boost Diabetic, which is Lactose-free and ready to drink. There are many protein supplements on the market. Whey and Soy based protein powders/drinks are acceptable. If you have any questions about specific products please call the office. d. Other foods may be taken if you have met the requirements of a, b, and c. These foods should be low fat, low sugar, and low spice, and blenderized to the point that, if needed, could be sucked through a straw. One of the favorite treats is dietetic canned fruit blenderized with a combination of its juices and crushed ice, and then eaten in small amounts with a spoon. A smooth low-calorie, low-fat yogurt (should be 100 calories or less) is acceptable as is low-fat or fat-free cottage cheese. If you are having difficulty with your diet, please call the office. 5. Medications: Take no more than 2 pills at a time. Wait 20 minutes between pills. a. Vitamins as listed above 
 
b. Acid reducing medicineWill be prescribed by your surgeon at discharge. c. Mylanta Plusone to two teaspoons as needed for belching or gas (other gas relieving medicines are also acceptable Beano, GasX, etc). d. Bowel Regulationmild laxatives are permissible such as Milk of Magnesia, Dulcolax (either by mouth or suppository). If you are accustomed to using a [id number] laxative not mentioned, you may continue to use it. Fibercon tablets or Benefiber may be taken as a fiber supplement to regulate bowel movements. Fibercon tablets should be broken in half and taken in half and taken one half in the morning and one half in the evening. Benefiber (1 tsp.) can be added to your protein drink(s). It has no taste or added thickness. Imodium AD may be taken as needed for diarrhea. 
 
e. Pain medication-one to two every four hours as needed for pain control. If pain is mild, try extra-strength Tylenol first. 
 
f. Do not take any pain or arthritis medication such as Aspirin, Advil, Aleve, Naprosyn, or any other nonsteroidal  
   A Tylenol product is OK. 
 
g. Preadmission medications may be resumed, but this should be discussed with your doctor before your discharge from the hospital. Resume Dialysis as scheduled. Future Appointments Date Time Provider Hollie Samia 10/26/2017 10:00 AM Shaheed Goodson NP DARRELL HERNÁNDEZ SCHED  
11/9/2017 10:20 AM REYNOLD Orellana EDGAR SCHED  
11/28/2017 10:00 AM REYNOLD Orellana Discharge Orders None Introducing [facility] SERVICES! [employer] introduces [employer] patient portal. Now you can access parts of your medical record, email your doctor's office, and request medication refills online. 1. In your internet browser, go to https://[employer]. [employer] 2. Click on the First Time User? Click Here link in the Sign In box. You will see the New Member Sign Up page. 3. Enter your [employer] Access Code exactly as it appears below. You will not need to use this code after youve completed the sign-up process. If you do not sign up before the expiration date, you must request a new code. · [employer] Access Code: [email or web address] Expires: 12/27/2017  9:42 AM 
 
4. Enter the last four digits of your Social Security Number (xxxx) and Date of Birth (mm/dd/yyyy) as indicated and click Submit. You will be taken to the next sign-up page. 5. Create a [employer] ID. This will be your [employer] login ID and cannot be changed, so think of one that is secure and easy to remember. 6. Create a [employer] password. You can change your password at any time. 7. Enter your Password Reset Question and Answer. This can be used at a later time if you forget your password. 8. Enter your e-mail address. You will receive e-mail notification when new information is available in [address] 9. Click Sign Up. You can now view and download portions of your medical record. 10. Click the Download Summary menu link to download a portable copy of your medical information. If you have questions, please visit the Frequently Asked Questions section of the [employer] website. Remember, [employer] is NOT to be used for urgent needs. For medical emergencies, dial 911. Now available from your iPhone and Android! General Information Please provide this summary of care documentation to your next provider. Patient Signature:  ____________________________________________________________ Date:  ____________________________________________________________  
  
Martin Naas Provider Signature:  ____________________________________________________________ Date:  ____________________________________________________________

## 2017-10-12 NOTE — H&P
HISTORY OF PRESENT ILLNESS    Magalis Land is a 38 y.o. male with morbid obesity. Chief Complaint   Patient presents with    Surg H&P       Schedule for Lap sleeve gasrectomy on 1/12/17 by Dr. Jill Harrison.           Patient Active Problem List   Diagnosis Code    Morbid obesity ([facility] E66.01    Essential hypertension I10    Type 2 diabetes mellitus with kidney complication, without long-term current use of insulin ([facility]) E11.29    ESRD (end stage renal disease) ([facility] N18.6      Past Medical History:   Diagnosis Date    Chronic kidney disease       HEMODIALYSIS    Diabetes ([facility]       NO LONGER REQUIRES MEDICATION SINCE DIALYSIS    Hypertension       NO LONGER REQUIRES MEDICATION SINCE DIALYSIS    Hypovitaminosis D      Morbid obesity ([facility]              Past Surgical History:   Procedure Laterality Date    HX LAP CHOLECYSTECTOMY   06/2017    HX OTHER SURGICAL         left A-V graft    HX OTHER SURGICAL   12/2015     Tinkcoff cath for PD, used 2 months then removed      Social History            Social History    Marital status:        Spouse name: N/A    Number of children: 4    Years of education: N/A            Occupational History    disabled          used to work as fabricator for [employer] History Main Topics    Smoking status: Never Smoker    Smokeless tobacco: Never Used    Alcohol use No    Drug use: No    Sexual activity: Yes       Partners: Female           Other Topics Concern    Not on file          Social History Narrative     In the home with spouse and 4 children (2516 and 3year old twins)     Disability since renal failure and on Dialysis M/W/F         Current Outpatient Prescriptions:     Omeprazole delayed release (PRILOSEC D/R) 20 mg tablet, Take 1 Tab by mouth daily. Daily after surgery, Disp: 30 Tab, Rfl: 1    ondansetron (ZOFRAN ODT) 4 mg disintegrating tablet, Take 1 Tab by mouth every eight (8) hours as needed for Nausea.  After surgery, Disp: 20 Tab, Rfl: 0    hyoscyamine SL (LEVSIN/SL) 0.125 mg SL tablet, 1 Tab by SubLINGual route every four (4) hours as needed for Cramping. After surgery as needed for pressure and spasm upper abdomen / chest, Disp: 30 Tab, Rfl: 0  No Known Allergies     NANCY Spring, DO     Renal  Courtney Mansfield MD     Review of Systems   Constitutional: Positive for malaise/fatigue. Negative for chills, fever and weight loss. HENT: Negative for congestion, hearing loss and tinnitus. Eyes: Negative for blurred vision. Respiratory: Negative for cough, shortness of breath and wheezing.         - sleep study - [facility]   Cardiovascular: Negative for chest pain, palpitations and leg swelling. Gastrointestinal: Negative for abdominal pain, blood in stool, constipation, diarrhea, heartburn, nausea and vomiting. Genitourinary: Negative for flank pain. Dialysis M/W/F at [facility] in [place]-Dr. Courtney Mansfield  HD past 15 months   Tried PD and \"didn't work too well\"   Musculoskeletal: Positive for joint pain and myalgias. Negative for falls. Skin: Negative. Neurological: Negative for dizziness, tingling, seizures and loss of consciousness. Endo/Heme/Allergies:        Last HgA1C < 7%   \"no meds since dialysis\"   Psychiatric/Behavioral: The patient does not have insomnia. Wife and cousin had WLS          Physical Exam   Visit Vitals    /67 (BP Patient Position: At rest)    Pulse 85    Temp 98 °F (36.7 °C)    Resp 18    Ht 5' 5.5\" (1.664 m)    Wt 244 lb (110.7 kg)    SpO2 99%    BMI 39.99 kg/m2       Constitutional: He is oriented to person, place, and time. No distress. AA male, unaccompanied    HENT:   Head: Normocephalic. Mouth/Throat: No oropharyngeal exudate. Missing left rear molar    Eyes: Pupils are equal, round, and reactive to light. No scleral icterus. Neck: Normal range of motion. Neck supple. No JVD present. No tracheal deviation present.  No thyromegaly present. Cardiovascular: Normal rate and regular rhythm. Tachycardic   Left UE AVF + bruit    Pulmonary/Chest: Effort normal and breath sounds normal. No respiratory distress. Abdominal: Soft. Bowel sounds are normal. He exhibits no distension. There is no tenderness. Musculoskeletal: Normal range of motion. He exhibits no edema. Lymphadenopathy:     He has no cervical adenopathy. Neurological: He is alert and oriented to person, place, and time. Skin: Skin is warm and dry. He is not diaphoretic. Psychiatric: He has a normal mood and affect.       ASSESSMENT and PLAN      ICD-10-CM ICD-9-CM     1. Morbid obesity with BMI of 39.0-39.99 E66.01 278.01       Z68.41 V85.41     2. ESRD (end stage renal disease) ([facility] N18.6 585. 6     3. Essential hypertension I10 401. 9        Scheduled for Sleeve gastrectomy for treatment of morbid obesity  on 10/12/17 with Dr. Dayami Martínez.

## 2017-10-12 NOTE — ROUTINE PROCESS
Patient: Haylie Adams. MRN: [id number]  SSN: [id number]   YOB: 1975  Age: 38 y.o. Sex: male     Patient is status post Procedure(s):  LAPAROSCOPIC GASTRECTOMY SLEEVE    ESOPHAGOGASTRODUODENOSCOPY (EGD). Surgeon(s) and Role:     * Shashi Kramer MD - Primary    Local/Dose/Irrigation: Marcaine 0.5%- 30 ml                  Peripheral IV 10/12/17 Right Wrist (Active)          Neri Dates #1 10/12/17 Left;Upper; Anterior Abdomen (Active)   Site Assessment Clean, dry, & intact 10/12/2017 10:00 AM   Dressing Status Clean, dry, & intact 10/12/2017 10:00 AM   Drainage Description Serosanguinous 10/12/2017 10:00 AM   Status Patent; Charged 10/12/2017 10:00 AM   Y Connector Used No 10/12/2017 10:00 AM      Airway - Endotracheal Tube 10/12/17 Oral (Active)                   Dressing/Packing:  Wound Abdomen Anterior-DRESSING TYPE: Topical skin adhesive/glue (10/12/17 1002)  Splint/Cast:  ]    Other:  Trocar sites x 4  19 Fr Codey drain

## 2017-10-12 NOTE — PROGRESS NOTES
Bedside shift change report given to [address] (oncoming nurse) by Florence Cnuningham (offgoing nurse). Report included the following information SBAR, Kardex, Intake/Output and MAR.

## 2017-10-12 NOTE — PROGRESS NOTES
Clinical Care Lead Arrival Summary        Name: Vicki Duque MRN: [id number]  Date: 10/12/2017 11:57 AM  Admission Date: 10/12/2017  : 1975  Situation:  10/12/17 LAP GASTRIC SLEEVE WITH EGD BY DR. MARTÍNEZ    Background:     Medical History      Past Medical History Date Comments   Chronic kidney disease [N18.9]  HEMODIALYSIS   Diabetes ([facility] [E11.9]  NO LONGER REQUIRES MEDICATION SINCE DIALYSIS   Hypertension [I10]  NO LONGER REQUIRES MEDICATION SINCE DIALYSIS   Hypovitaminosis D [E55.9]     Morbid obesity ([address] [E66.01]        Surgical History      Past Surgical History Laterality Date Comments   HX LAP CHOLECYSTECTOMY[SHX56]  2017    HX OTHER SURGICAL[NZD937]   left A-V graft   HX OTHER SURGICAL[KJU810]  2015 Tinkcoff cath for PD, used 2 months then removed      Social History      Category History   Smoking Status Never Smoker   Smokeless Tobacco Status Never Used   Alcohol Use No   Drug Use No   Sexually Active Yes; Female partners   ADL             The Beta blocker the patient is taking is [unfilled], NONE          STAND: POSTOP  Voice quality/secretions:    Hx of dysphagia:    O2 sat:    Alertness:      Flu vaccination received for current season:SCREEN FOR FLU VACCINE    VTE Documentation-BOTH CHEMICAL AND MECHANICAL VTE  VTE Risk Assessment    Mechanical VTE orders: Mechanical VTE Orders: Yes  Venous Foot Pump:    Graduated Compression Stockings:    Sequential Compression Devices: Sequential Compression Device: Bilateral  Patient Refused VTE:        Diet:  ICE CHIPS, SUGAR FREE POPSICLES TODAY-ADVANCE DIET TOMORROW              Assessment:    Lines/Drains/Airways:   Peripheral IV 10/12/17 Right Wrist (Active)   Site Assessment Clean, dry, & intact 10/12/2017 11:35 AM   Phlebitis Assessment 0 10/12/2017 11:35 AM   Infiltration Assessment 0 10/12/2017 11:35 AM   Dressing Status Clean, dry, & intact 10/12/2017 11:35 AM   Dressing Type Transparent 10/12/2017 11:35 AM   Hub Color/Line Status Blue; Infusing 10/12/2017 11:35 AM   Action Taken Open ports on tubing capped 10/12/2017 11:35 AM   Alcohol Cap Used Yes 10/12/2017 11:35 AM       Destiny Shanks #1 10/12/17 Left;Upper; Anterior Abdomen (Active)   Site Assessment Clean, dry, & intact 10/12/2017 11:35 AM   Dressing Status Clean, dry, & intact 10/12/2017 11:35 AM   Drainage Description Serosanguinous 10/12/2017 11:35 AM   Codey Drain Airleak No 10/12/2017 11:35 AM   Status Patent; Charged;Draining 10/12/2017 11:35 AM   Y Connector Used No 10/12/2017 10:00 AM   Output (ml) 30 ml 10/12/2017 10:32 AM       Wound Abdomen Anterior (Active)   DRESSING STATUS Clean, dry, and intact 10/12/2017 11:34 AM   DRESSING TYPE Topical skin adhesive/glue 10/12/2017 11:34 AM   Incision site well approximated?  Yes 10/12/2017 10:02 AM       Last 3 Weights:  Last 3 Recorded Weights in this Encounter    10/12/17 0751   Weight: 110.7 kg (244 lb)     DAILY STANDING WEIGHT BETWEEN 12 MIDNIGHT AND 0700  Recommendations: POST GASTRIC SLEEVE PATHWAY-LENGTH OF STAY 1.6 DAYS  Consult Orders: )IP CONSULT TO NEPHROLOGY  Recent orders:  APPLY/MAINTAIN SEQUENTIAL COMPRESSION DEVICE  NOTIFY ANESTHESIA  APPLY/MAINTAIN SEQUENTIAL COMPRESSION DEVICE  POC GLUCOSE  SALINE LOCK IV  INITIAL PHYSICIAN ORDER: INPATIENT  FULL CODE  POC GLUCOSE  VITAL SIGNS PER UNIT ROUTINE  ELEVATE HEAD OF BED  NOTIFY PROVIDER: VITAL SIGNS CHANGES  CARDIAC MONITORING  BLADDER CHECKS  INTAKE AND OUTPUT  INCENTIVE SPIROMETRY  MAY HAVE ICE CHIPS  AMBULATE WITH ASSISTANCE  OUT OF BED IN CHAIR  [address] [place] TO BULB  NOTIFY PROVIDER: SPECIFY  NOTIFY PROVIDER: SPECIFY  SIPS OF CLEAR LIQUIDS  SALINE LOCK IV  IP CONSULT TO NUTRITION SERVICES  IP CONSULT TO NEPHROLOGY    Core Measures Compliant   CHF:NA   Pneumonia:NA   Vaccines:SCREEN   SCIP:YES   Lopez:NA   AMI:NA  OFF PATHWAY WITH NEPHROLOGY CONSULT FOR ELEVATED BUN AND CREATININE FROM 9/28/17 PAT LAB DRAW

## 2017-10-12 NOTE — BRIEF OP NOTE
BRIEF OPERATIVE NOTE    Date of Procedure: 10/12/2017   Preoperative Diagnosis: MORBID OBESITY   Postoperative Diagnosis: MORBID OBESITY    Procedure(s):  LAPAROSCOPIC GASTRECTOMY SLEEVE    ESOPHAGOGASTRODUODENOSCOPY (EGD)  Surgeon(s) and Role:     * Nery Domingo MD - Primary         Assistant Staff:  Physician Assistant: NAVJOT Sadler    Surgical Staff:  Circ-1: Jossue Salinas RN  Circ-Relief: Sonal Franklin RN  Physician Assistant: NAVJOT Sadler  Scrub Tech-1: Lupis Chávez  Event Time In   Incision Start 5784   Incision Close 1005     Anesthesia: General   Estimated Blood Loss: 30 cc  Specimens:   ID Type Source Tests Collected by Time Destination   1 : partial gastrectomy Fresh Abdomen  Nery Domingo MD 10/12/2017 0957 Pathology      Findings: sleeve created over 40F bougie; no air leak or hemorrhage on endoscopy   Complications: none  Implants:   Implant Name Type Inv.  Item Serial No.  Lot No. LRB No. Used Action   STAPLE REINF BIOSRB BLK [id number] -- F/ENDO [facility] IMPL - SN/A  STAPLE REINF BIOSRB BLK [id number] -- F/ENDO [facility] IMPL N/A [employer] [id number] N/A 1 Implanted   STAPLE REINF BIOSRB PUR [id number] -- F/ENDO [facility] PUR - SN/A  STAPLE REINF BIOSRB PUR [id number] -- F/ENDO Benay Mckusick PUR N/A [employer] [id number] N/A 3 Implanted   STAPLE REINF BIOSRB PUR [id number] -- F/ENDO [employer] PUR - [id number]   STAPLE REINF BIOSRB PUR [id number] -- F/ENDO Benay Mckusick PUR [id number]  [address] [id number] N/A 1 Implanted

## 2017-10-12 NOTE — CONSULTS
CHRONIC KIDNEY DISEASE (CKD)    Subjective:     Patient is a 38 y.o.  male has been admitted to the hospital for a gastric sleeve which was done today. .  Patient was referred for follow up of renal disease. He has ESRF for which he gets dialysis at the [facility] unit in [facility] on a MWF schedule. He is feeling well with just a little soreness from today's surgery. Patient Active Problem List    Diagnosis Date Noted    Morbid obesity ([address] 02/09/2017    Essential hypertension 02/09/2017    Type 2 diabetes mellitus with kidney complication, without long-term current use of insulin ([address] 02/09/2017    ESRD (end stage renal disease) ([facility] 02/09/2017     Past Medical History:   Diagnosis Date    Chronic kidney disease     HEMODIALYSIS    Diabetes ([facility]     NO LONGER REQUIRES MEDICATION SINCE DIALYSIS    Hypertension     NO LONGER REQUIRES MEDICATION SINCE DIALYSIS    Hypovitaminosis D     Morbid obesity ([facility]       Past Surgical History:   Procedure Laterality Date    HX LAP CHOLECYSTECTOMY  06/2017    HX OTHER SURGICAL      left A-V graft    HX OTHER SURGICAL  12/2015    Tinkcoff cath for PD, used 2 months then removed      Prior to Admission medications    Medication Sig Start Date End Date Taking? Authorizing Provider   ferric citrate (AURYXIA) 210 mg iron tablet Take  by mouth three (3) times daily (with meals). AND 2 TABLETS WITH SNACKS   Yes Historical Provider   Omeprazole delayed release (PRILOSEC D/R) 20 mg tablet Take 1 Tab by mouth daily. Daily after surgery 9/28/17   Reyes Mann, NP   ondansetron (ZOFRAN ODT) 4 mg disintegrating tablet Take 1 Tab by mouth every eight (8) hours as needed for Nausea. After surgery 9/28/17   Reyes Mann, NP   hyoscyamine SL (LEVSIN/SL) 0.125 mg SL tablet 1 Tab by SubLINGual route every four (4) hours as needed for Cramping.  After surgery as needed for pressure and spasm upper abdomen / chest 9/28/17   Reyes Mann, NP     Current Facility-Administered Medications   Medication Dose Route Frequency    sodium chloride (NS) flush 5-10 mL  5-10 mL IntraVENous Q8H    sodium chloride (NS) flush 5-10 mL  5-10 mL IntraVENous PRN    naloxone (NARCAN) injection 0.4 mg  0.4 mg IntraVENous PRN    0.9% sodium chloride infusion  50 mL/hr IntraVENous CONTINUOUS    HYDROmorphone (PF) (DILAUDID) injection 1 mg  1 mg IntraVENous Q2H PRN    ondansetron (ZOFRAN) injection 4 mg  4 mg IntraVENous Q4H PRN    diphenhydrAMINE (BENADRYL) injection 25 mg  25 mg IntraVENous ONCE PRN    heparin (porcine) injection 5,000 Units  5,000 Units SubCUTAneous Q8H    scopolamine (TRANSDERM-SCOP) 1.5 mg  1.5 mg TransDERmal Q72H    hyoscyamine (LEVSIN) 0.125mg/mL solution 125 mcg  125 mcg Oral Q4H PRN    LORazepam (ATIVAN) injection 1 mg  1 mg IntraVENous Q6H PRN    acetaminophen (OFIRMEV) infusion 1,000 mg  1,000 mg IntraVENous Q8H     No Known Allergies   Social History   Substance Use Topics    Smoking status: Never Smoker    Smokeless tobacco: Never Used    Alcohol use No      Family History   Problem Relation Age of Onset    Diabetes Mother     Hypertension Mother     Diabetes Maternal Aunt     Hypertension Maternal Aunt     Diabetes Maternal Grandmother     Hypertension Maternal Grandmother         Review of Systems  Constitutional: negative  Ears, nose, mouth, throat, and face: negative  Respiratory: negative  Cardiovascular: negative  Gastrointestinal: positive for mild abdominal soreness  Musculoskeletal:negative  Neurological: negative  skin: no rashes or lesions    Objective:     Patient Vitals for the past 8 hrs:   BP Temp Pulse Resp SpO2   10/12/17 1615 157/73 97.7 °F (36.5 °C) 77 18 99 %   10/12/17 1335 - - - - 99 %   10/12/17 1330 160/76 98 °F (36.7 °C) 84 16 99 %   10/12/17 1230 148/75 - 79 18 98 %   10/12/17 1215 151/74 - 85 16 98 %   10/12/17 1200 141/64 - 88 15 98 %   10/12/17 1145 163/69 - 90 16 98 %   10/12/17 1130 - - 85 19 99 %   10/12/17 1115 161/75 - 86 19 99 %   10/12/17 1100 147/72 - 89 16 98 %   10/12/17 1045 153/80 - 85 19 99 %   10/12/17 1040 149/73 - 84 19 99 %   10/12/17 1035 143/75 - 84 17 99 %   10/12/17 1032 159/79 97.8 °F (36.6 °C) 84 17 98 %   10/12/17 1030 153/80 - 86 (!) 35 98 %      Temp (24hrs), Av.9 °F (36.6 °C), Min:97.7 °F (36.5 °C), Max:98 °F (36.7 °C)       10/12 07 - 10/12 1900  In: 350 [I.V.:350]  Out: 40 [Drains:30]      Visit Vitals    /73 (BP 1 Location: Right arm, BP Patient Position: At rest)    Pulse 77    Temp 97.7 °F (36.5 °C)    Resp 18    Ht 5' 5.5\" (1.664 m)    Wt 110.7 kg (244 lb)    SpO2 99%    BMI 39.99 kg/m2     General appearance: alert, cooperative, no distress  Head: Normocephalic, without obvious abnormality, atraumatic  Eyes: EOMI; anicteric sclera  Neck: no JVD and supple  Lungs: clear to auscultation bilaterally  Heart: regular rate and rhythm; no gallop or rub  Abdomen: soft; mild discomfort with palpation; no guarding; no distension  Extremities: no edema  Skin: Skin color, texture, turgor normal. No rashes or lesions  Neurologic: Grossly normal        Assessment:     Data Review   CBC:   Lab Results   Component Value Date/Time    WBC 6.8 2017 10:27 AM    RBC 3.80 2017 10:27 AM    HGB 11.3 2017 10:27 AM    HCT 37.3 2017 10:27 AM    PLATELET 182  10:27 AM   , CMP:   Lab Results   Component Value Date/Time    Glucose 110 2017 10:27 AM    Sodium 138 2017 10:27 AM    Potassium 4.6 2017 10:27 AM    Chloride 99 2017 10:27 AM    CO2 24 2017 10:27 AM    BUN 55 2017 10:27 AM    Creatinine 10.50 2017 10:27 AM    Calcium 7.7 2017 10:27 AM    Anion gap 15 2017 10:27 AM    BUN/Creatinine ratio 5 2017 10:27 AM    AST (SGOT) 7 2017 10:27 AM    Alk.  phosphatase 127 2017 10:27 AM    Protein, total 8.5 2017 10:27 AM    Albumin 3.9 2017 10:27 AM    Globulin 4.6 09/28/2017 10:27 AM    A-G Ratio 0.8 09/28/2017 10:27 AM       Reviewed: labs         PROBLEMS:    End-stage renal failure on a MWF schedule at the [facility]. S/p gastric sleeve today. Morbid obesity. Hypertension. Diabetes Mellitus but no longer on medication. Plan:     Dialysis and labs in AM.  Orders written and have sent a message to the acute team.      D/w the patient and his RN. Chart reviewed. Pertinent Notes Reviewed. Medications list Personally Reviewed. Giovanni Leija, [address] [facility]  [facility] [place]  [facility] [address]  [place], [address]  Phone - [phone]    Fax - [phone]  [email or web address]                                         [facility]   [address] [address]  Phone - [phone]  Fax - [phone]. PANOSOL

## 2017-10-13 LAB
ALBUMIN SERPL-MCNC: 3.5 G/DL (ref 3.5–5)
ANION GAP SERPL CALC-SCNC: 14 MMOL/L (ref 5–15)
BACTERIA SPEC CULT: NORMAL
BACTERIA SPEC CULT: NORMAL
BUN SERPL-MCNC: 58 MG/DL (ref 6–20)
BUN/CREAT SERPL: 5 (ref 12–20)
CALCIUM SERPL-MCNC: 7.8 MG/DL (ref 8.5–10.1)
CHLORIDE SERPL-SCNC: 99 MMOL/L (ref 97–108)
CO2 SERPL-SCNC: 23 MMOL/L (ref 21–32)
CREAT SERPL-MCNC: 12.7 MG/DL (ref 0.7–1.3)
ERYTHROCYTE [DISTWIDTH] IN BLOOD BY AUTOMATED COUNT: 14.8 % (ref 11.5–14.5)
GLUCOSE SERPL-MCNC: 89 MG/DL (ref 65–100)
HCT VFR BLD AUTO: 40.3 % (ref 36.6–50.3)
HGB BLD-MCNC: 12.6 G/DL (ref 12.1–17)
MCH RBC QN AUTO: 30.4 PG (ref 26–34)
MCHC RBC AUTO-ENTMCNC: 31.3 G/DL (ref 30–36.5)
MCV RBC AUTO: 97.1 FL (ref 80–99)
PHOSPHATE SERPL-MCNC: 8.2 MG/DL (ref 2.6–4.7)
PLATELET # BLD AUTO: 198 K/UL (ref 150–400)
POTASSIUM SERPL-SCNC: 4.7 MMOL/L (ref 3.5–5.1)
RBC # BLD AUTO: 4.15 M/UL (ref 4.1–5.7)
SERVICE CMNT-IMP: NORMAL
SODIUM SERPL-SCNC: 136 MMOL/L (ref 136–145)
WBC # BLD AUTO: 7.2 K/UL (ref 4.1–11.1)

## 2017-10-13 PROCEDURE — 80069 RENAL FUNCTION PANEL: CPT | Performed by: INTERNAL MEDICINE

## 2017-10-13 PROCEDURE — 65660000000 HC RM CCU STEPDOWN

## 2017-10-13 PROCEDURE — 90935 HEMODIALYSIS ONE EVALUATION: CPT

## 2017-10-13 PROCEDURE — 36415 COLL VENOUS BLD VENIPUNCTURE: CPT | Performed by: INTERNAL MEDICINE

## 2017-10-13 PROCEDURE — 74011000250 HC RX REV CODE- 250: Performed by: INTERNAL MEDICINE

## 2017-10-13 PROCEDURE — 85027 COMPLETE CBC AUTOMATED: CPT | Performed by: INTERNAL MEDICINE

## 2017-10-13 PROCEDURE — 74011250637 HC RX REV CODE- 250/637: Performed by: SURGERY

## 2017-10-13 PROCEDURE — 74011250636 HC RX REV CODE- 250/636: Performed by: SURGERY

## 2017-10-13 RX ORDER — HYDROMORPHONE HYDROCHLORIDE 2 MG/1
2-4 TABLET ORAL
Status: DISCONTINUED | OUTPATIENT
Start: 2017-10-13 | End: 2017-10-14 | Stop reason: HOSPADM

## 2017-10-13 RX ORDER — LIDOCAINE 40 MG/G
CREAM TOPICAL
Status: COMPLETED | OUTPATIENT
Start: 2017-10-13 | End: 2017-10-13

## 2017-10-13 RX ORDER — LOSARTAN POTASSIUM 25 MG/1
25 TABLET ORAL DAILY
Status: DISCONTINUED | OUTPATIENT
Start: 2017-10-13 | End: 2017-10-14 | Stop reason: HOSPADM

## 2017-10-13 RX ORDER — HYDROMORPHONE HYDROCHLORIDE 2 MG/1
2-4 TABLET ORAL
Qty: 30 TAB | Refills: 0 | Status: SHIPPED | OUTPATIENT
Start: 2017-10-13 | End: 2017-10-26 | Stop reason: ALTCHOICE

## 2017-10-13 RX ADMIN — ACETAMINOPHEN 1000 MG: 10 INJECTION, SOLUTION INTRAVENOUS at 03:02

## 2017-10-13 RX ADMIN — HEPARIN SODIUM 5000 UNITS: 5000 INJECTION, SOLUTION INTRAVENOUS; SUBCUTANEOUS at 06:15

## 2017-10-13 RX ADMIN — HEPARIN SODIUM 5000 UNITS: 5000 INJECTION, SOLUTION INTRAVENOUS; SUBCUTANEOUS at 23:27

## 2017-10-13 RX ADMIN — LIDOCAINE: 40 CREAM TOPICAL at 16:27

## 2017-10-13 RX ADMIN — HYDROMORPHONE HYDROCHLORIDE 4 MG: 2 TABLET ORAL at 12:01

## 2017-10-13 RX ADMIN — HYDROMORPHONE HYDROCHLORIDE 2 MG: 2 TABLET ORAL at 17:41

## 2017-10-13 RX ADMIN — HEPARIN SODIUM 5000 UNITS: 5000 INJECTION, SOLUTION INTRAVENOUS; SUBCUTANEOUS at 15:23

## 2017-10-13 RX ADMIN — HYDROMORPHONE HYDROCHLORIDE 2 MG: 2 TABLET ORAL at 23:31

## 2017-10-13 RX ADMIN — Medication 10 ML: at 06:00

## 2017-10-13 NOTE — PROGRESS NOTES
Problem: Gastric Sleeve Pathway / Bariatric Revision Pathway: Post-Op Day 1  Goal: *No signs and symptoms of infection or wound complications  Outcome: Progressing Towards Goal  Patient shows no signs of infection at this time or complication   Goal: *Optimal pain control at patients stated goal  Outcome: Progressing Towards Goal  Patient pain is well controlled with oral dilaudid   Goal: *Adequate urinary output (equal to or greater than 30 milliliters/hour)  Outcome: Progressing Towards Goal  Variance: Patient Condition     Patient has chronic kidney disease and is on dialysis   Goal: *Tolerating diet  Outcome: Progressing Towards Goal  Patient is tolerating his full liquids     Problem: Falls - Risk of  Goal: *Absence of Falls  Document Monique Fall Risk and appropriate interventions in the flowsheet.    Outcome: Progressing Towards Goal  Fall Risk Interventions:              Medication Interventions: Patient to call before getting OOB

## 2017-10-13 NOTE — OP NOTES
[address]   [address], [address]   OP NOTE       Name:  Estelita Weiss   MR#:  [id number]   :  1975   Account #:  [de-identified]    Surgery Date:  10/12/2017   Date of Adm:  10/12/2017       PREOPERATIVE DIAGNOSIS:     PRIMARY PREOPERATIVE DIAGNOSIS: Morbid obesity. SECONDARY PREOPERATIVE DIAGNOSES:   1. Essential hypertension. 2. Type 2 diabetes mellitus. 3. End-stage renal disease. POSTOPERATIVE DIAGNOSES:   1. Morbid obesity. 2. Essential hypertension. 3. Type 2 diabetes mellitus. 4. End-stage renal disease. PROCEDURES PERFORMED:   1. Laparoscopic sleeve gastrectomy. 2. Intraoperative upper endoscopy. ATTENDING SURGEON: Prema Valenzuela MD.    NAVJOT Koenig.    ANESTHESIA: General endotracheal.    DRAINS: 19 mm Ocdey drain. COUNTS: Sponge count correct. Needle count correct. SPECIMENS REMOVED: Gastric fundus. ESTIMATED BLOOD LOSS: 30 mL. COMPLICATIONS: None. INDICATIONS: The patient is a 47-year-old [facility] American male   with a height of 65 inches, a weight of 244 pounds, with a resultant   body mass index of 40 kg/m2 on a medium frame. He has the above-  listed obesity-related conditions. All medical efforts at weight loss have   been unsuccessful. After extensive preoperative counseling, patient   education, and medical screening, it was felt he would be a good   candidate for weight-reduction surgery. He presents to [facility] today for laparoscopic sleeve gastrectomy. I have asked   NAVJOT Recinos, to assist with the procedure given the technical   complexity of laparoscopic bariatric surgery. She will run the   laparoscope and assist in creation of the sleeve. FINDINGS:   1. Sleeve gastrectomy created over 40-Polish suction Bougie. 2. No intraluminal hemorrhage or insufflation air leak on upper   endoscopy.     DESCRIPTION OF PROCEDURE: The patient was identified as the   correct patient in the preoperative holding area and informed consent   was confirmed. After answering the patient's remaining questions and   confirming he had received 5000 units of subcutaneous heparin, he   was taken to the operating room and placed on the operating room   table in the supine position. Sequential compression devices were   placed on both lower extremities. Following the uneventful initiation of   general anesthesia, he was carefully secured to the operating room   table with foot board and safety strap in place. All potential pressure   points were padded with egg crate. His abdomen was prepped and   draped in the usual sterile fashion. Final time-out was performed, and it   was confirmed he had received intravenous antibiotics. A 5 mm trocar was inserted through a small left subcostal skin incision   using an Optiview technique. After confirming intraperitoneal location   of the trocar tip, insufflation with carbon dioxide gas was initiated. Once   adequate working space had been developed, the 5 mm, 30-degree   laparoscope was inserted. No signs of trocar injury were present. The   liver was noted to be of normal size and texture. A 5 mm trocar was   inserted through a small incision 6 cm superior to the umbilicus using   visual guidance with the laparoscope. The patient was placed in the   steep reverse Trendelenburg position. Two right upper quadrant   trocars, one 5 mm and the other 15 mm, were inserted using identical   technique. The [facility] liver retractor was inserted through a small   subxiphoid incision. With the liver retracted anteriorly and cephalad,   the esophageal hiatus was visualized. No signs of hiatal hernia were   present. The pylorus was identified, and a site 4 cm proximal to the   pylorus was chosen. The gastrocolic ligament was incised at this   location using the bipolar device. This allowed atraumatic entry into the   lesser sac.  The gastrocolic ligament was serially ligated and divided   using the bipolar device. This included takedown of the short gastric   vessels and retrogastric attachments. The fundus was freed from the   entirety of the left carrie of the diaphragm. The fat pad was mobilized   using the bipolar device. A 40-Monegasque suction Bougie was passed   transorally, through the gastroesophageal junction, and along the   lesser curve of the stomach into the antrum. Suction was then initiated. Sleeve gastrectomy was initiated with the firing of a black load linear   stapler, fired from the site 4 cm proximal to the pylorus in the direction   of the incisura angularis. The remainder of the sleeve was created with   multiple firings of a linear stapler using purple load cartridges, also a   SeamGuard material. Care was taken to avoid narrowing at the   incisura angularis. Following sleeve construction, the fundus was   removed from the patient's body and sent to Pathology for review. Once pneumoperitoneum had been reestablished, suction was   released from the Bougie and the Bougie was removed. The patient   was placed in the supine position. Sterile saline was instilled into the   upper abdomen. Intraoperative upper endoscopy was performed. The   gastroscope was passed transorally, through the gastroesophageal   junction, and along the length of the sleeve to the level of the pylorus. No intraluminal hemorrhage was identified, and no insufflation air leak   occurred. No difficulty passing the scope to the level of the pylorus was   encountered. The stomach was decompressed and the gastroscope   was removed. Sterile saline was evacuated from the upper abdomen. A 19 mm Codey drain was inserted into the abdominal space. It was   allowed to lie adjacent to the staple line and brought out through the   left subcostal 5 mm trocar wound. It was secured to the skin with a 2-0   nylon suture.  After confirming adequate hemostasis, the [place]   liver retractor was removed, followed by closure of the right upper   quadrant 15 mm fascial defect using a 0 Vicryl suture with a   laparoscopic suture passer. Pneumoperitoneum was released, and all   trocars were removed. All wounds were infiltrated with 0.5% Marcaine   without epinephrine. All skin edges were reapproximated with a   combination of subcuticular 4-0 Monocryl suture and Dermabond. The   patient tolerated the procedure well. He was extubated in the operating   room and transported to the recovery area in stable condition. The attending surgeon, Dr. Oscar Gonzalez, was scrubbed and present for   the entire procedure.         MD Eugenio Womack / Nimesh Pritchard   D:  10/12/2017   18:58   T:  10/12/2017   22:05   Job #:  [id number]

## 2017-10-13 NOTE — PROGRESS NOTES
Problem: Patient Education: Go to Patient Education Activity  Goal: Patient/Family Education  Outcome: Resolved/Met Date Met:  10/13/17  NUTRITION      Chart reviewed. Post-op bariatric diet instruction completed. Will gladly follow up for additional questions as needed. Thank you.       Ambar Merrill RD

## 2017-10-13 NOTE — DISCHARGE INSTRUCTIONS
Sleeve Gastrectomy  Patient Discharge Instructions  General Surgery -[facility]  [phone]    1. Activities: You may be active walking, stair climbing, and doing light weight activities with one to two-pound weights, sitting in a chair using different arm motions. Major restrictions include driving until your first office visit and lifting anything heavier than ten pounds. It is very important that you walk frequently. In addition to walking, you should continue to use your incentive spirometer (breathing exercises) throughout the day. 2. Caring for vour incision (s}: Your surgical incision(s) will be covered with Derma bond (super glue). You may shower as desired and simply pat dry the area over the incision(s). There may occasionally be seepage of yellowish to yellowish-maroon dissolved fat from your wound, which is of no major concern as long as the wound is not red, hardened in the area of the drainage, or if the drainage has a foul smell. If there are any questions, call our office for further instructions. If there is this type of dissolved fat drainage,  the shower and gently express the fluid from your wound. Then keep gauze pads over the area to protect both the wound and your clothes. 3. When to call the office immediately:      *Chest pain (not associated with eating/drinking)  *Shortness of breath (more than normal)  *Sudden pain and/or redness in calf  *Fever greater than 101F  *Persistent nausea and/or vomiting (unable to keep down any liquids)  *Bleeding from incision(s)  *Severe abdominal pain  *Any other concerning symptoms    4. Diet: There are three main priorities as far as your diet is concerned---    a. Clear Liquids-drink from 1 oz. cups or standard shot glass. These liquids are non-carbonated, no added sugar, and not irritating to your stomach.  They may include water, tea, coffee\" 100% no added sugar juices (avoid citrus) , clear broth, blenderized clear soups such as Edmundo' Healthy Request Chicken Noodle and Chicken & Rice, Sugar-free products including popsicles, Luis Angel-Aid, and Crystal Lite. b. Vitamins: Multi-vitamin with iron in the morning and evening, making sure  it's not the first thing or the last thing taken. The other Vitamin B-12 and  vitamin  D capsule may be taken once during the day anytime that you  can make a routine of it.    c. Protein Intake: During your initial two-three weeks when your body is switching from burning glucose to directly burning fat, there is an attempt by your body to use protein as a fuel. To protect that protein, we like you to exercise as listed above, and to try to take in 50-60 grams of protein per day. This can be done with four 8 oz. serving of skim milk and Low Carb [place] Instant Breakfast. Each 8 oz. should be considered a meal-breakfast, lunch, or supper, and during this mealtime it should be the sole ingested substance. Stop your clear liquids for 20 minutes before your start on the instant breakfast, which is sipped 1 oz. at a time. You may also try the following substitute for [place] Instant Breakfast: skim milk-fat free powdered milk + Egg Beaters + flavor extract. If desired, ice may be added and blenderized in the . If the milk upsets your stomach, you may purchase Lactaid tablets from any drug store. Lactaid skim milk may be purchased at most major supermarkets. Another alternative is Boost Diabetic, which is Lactose-free and ready to drink. There are many protein supplements on the market. Whey and Soy based protein powders/drinks are acceptable. If you have any questions about specific products please call the office. d. Other foods may be taken if you have met the requirements of a, b, and c. These foods should be low fat, low sugar, and low spice, and blenderized to the point that, if needed, could be sucked through a straw.  One of the favorite treats is dietetic canned fruit blenderized with a combination of its juices and crushed ice, and then eaten in small amounts with a spoon. A smooth low-calorie, low-fat yogurt (should be 100 calories or less) is acceptable as is low-fat or fat-free cottage cheese. If you are having difficulty with your diet, please call the office. 5. Medications: Take no more than 2 pills at a time. Wait 20 minutes between pills. a. Vitamins as listed above    b. Acid reducing medicine--Will be prescribed by your surgeon at discharge. c. Mylanta Plus--one to two teaspoons as needed for belching or gas (other gas relieving medicines are also acceptable Beano, GasX, etc). d. Bowel Regulation--mild laxatives are permissible such as Milk of Magnesia, Dulcolax (either by mouth or suppository). If you are accustomed to using a [id number] laxative not mentioned, you may continue to use it. Fibercon tablets or Benefiber may be taken as a fiber supplement to regulate bowel movements. Fibercon tablets should be broken in half and taken in half and taken one half in the morning and one half in the evening. Benefiber (1 tsp.) can be added to your protein drink(s). It has no taste or added thickness. Imodium AD may be taken as needed for diarrhea.    e. Pain medication-one to two every four hours as needed for pain control. If pain is mild, try extra-strength Tylenol first.    f. Do not take any pain or arthritis medication such as Aspirin, Advil, Aleve, Naprosyn, or any other nonsteroidal      A Tylenol product is OK.    g. Preadmission medications may be resumed, but this should be discussed with your doctor before your discharge from the hospital. Resume Dialysis as scheduled.     Future Appointments  Date Time Provider Hollie Gracia   10/26/2017 10:00 AM REYNOLD Raymond SCHED   11/9/2017 10:20 AM REYNOLD Raymond SCHED   11/28/2017 10:00 AM REYNOLD Raymond

## 2017-10-13 NOTE — PROGRESS NOTES
General Surgery Daily Progress Note    Admit Date: 10/12/2017  Post-Operative Day: 1 Day Post-Op from Procedure(s):  LAPAROSCOPIC GASTRECTOMY SLEEVE    ESOPHAGOGASTRODUODENOSCOPY (EGD)     Subjective:     Last 24 hrs: Doing well this morning. Experienced nausea yesterday afternoon, but better with Scopolamine patch. Pain rated 3/10 and located in upper abdomen. Denies NV.  +BM last night. Ambulated yesterday, but not yet this morning. Due for dialysis today. Denies fevers or chills. Objective:     Blood pressure 156/86, pulse 81, temperature 97.6 °F (36.4 °C), resp. rate 18, height 5' 5.5\" (1.664 m), weight 233 lb 4 oz (105.8 kg), SpO2 94 %. Temp (24hrs), Av.8 °F (36.6 °C), Min:97.6 °F (36.4 °C), Max:98 °F (36.7 °C)      _____________________  Physical Exam:     Alert and Oriented, cooperative, in no acute distress. Cardiovascular: RRR, trace peripheral edema  Lungs:CTAB   Abdomen: soft, +TTP along lap sites. +BS Drain with clear pink fluid in bulb. Assessment:   Active Problems: Morbid obesity ([address] (2017)          Plan:     Continue bariatric liquid diet. Ambulation & IS. Transitiont to oral analgesics. Dialysis later today. Probable d/c to home tomorrow. Further treatment per Dr. Meghan Bhat. Data Review:    Recent Labs      10/13/17   0311   WBC  7.2   HGB  12.6   HCT  40.3   PLT  198     Recent Labs      10/13/17   0311   NA  136   K  4.7   CL  99   CO2  23   GLU  89   BUN  58*   CREA  12.70*   CA  7.8*   PHOS  8.2*   ALB  3.5     No results for input(s): AML, LPSE in the last 72 hours.         ______________________  Medications:    Current Facility-Administered Medications   Medication Dose Route Frequency    HYDROmorphone (DILAUDID) tablet 2-4 mg  2-4 mg Oral Q4H PRN    sodium chloride (NS) flush 5-10 mL  5-10 mL IntraVENous Q8H    sodium chloride (NS) flush 5-10 mL  5-10 mL IntraVENous PRN    naloxone (NARCAN) injection 0.4 mg  0.4 mg IntraVENous PRN    0.9% sodium chloride infusion  50 mL/hr IntraVENous CONTINUOUS    HYDROmorphone (PF) (DILAUDID) injection 1 mg  1 mg IntraVENous Q2H PRN    ondansetron (ZOFRAN) injection 4 mg  4 mg IntraVENous Q4H PRN    heparin (porcine) injection 5,000 Units  5,000 Units SubCUTAneous Q8H    scopolamine (TRANSDERM-SCOP) 1.5 mg  1.5 mg TransDERmal Q72H    hyoscyamine (LEVSIN) 0.125mg/mL solution 125 mcg  125 mcg Oral Q4H PRN    LORazepam (ATIVAN) injection 1 mg  1 mg IntraVENous Q6H PRN       Shade Myles PA-C  10/13/2017

## 2017-10-13 NOTE — ROUTINE PROCESS
Paging [facility] to find out when they will be here for the patient and the patient would like lidocaine to numb the insertion site, so will ask the dialysis nurse if they do that or if they need an order from our doctors for it.

## 2017-10-13 NOTE — ROUTINE PROCESS
Bedside shift change report given to Juan Fernandez RN (oncoming nurse) by MATT Hough (offgoing nurse). Report included the following information SBAR, OR Summary, Intake/Output, Recent Results, Med Rec Status and Cardiac Rhythm NSR.

## 2017-10-13 NOTE — PROGRESS NOTES
[facility]   [address] [facility], [address] [place] [address], [facility]  Phone: [phone]   [phone]       Nephrology Progress Note  Gio Kuo.     1975     [id number]  Date of Admission : 10/12/2017  10/13/17    CC: Follow up for ESRD      Assessment and Plan   ESRD- HD :  - etiology : Hypertension . ? Familial FSGS  - HD MWF at [facility]   - HD today: target 1.5-2 kg UF     Anemia of CKD :  - No Epogen for now     Morbid obesity :  - s/p Sleeve gastrectomy 10.12.17    HTN :  - started low dose Losartan     Type II DM        Interval History:  Seen and examined   Diet advanced to liquids   Pain controlled  BP elevated     Review of Systems: Pertinent items are noted in HPI.     Current Medications:   Current Facility-Administered Medications   Medication Dose Route Frequency    HYDROmorphone (DILAUDID) tablet 2-4 mg  2-4 mg Oral Q4H PRN    lidocaine (XYLOCAINE) 4 % cream   Topical NOW    sodium chloride (NS) flush 5-10 mL  5-10 mL IntraVENous Q8H    sodium chloride (NS) flush 5-10 mL  5-10 mL IntraVENous PRN    naloxone (NARCAN) injection 0.4 mg  0.4 mg IntraVENous PRN    HYDROmorphone (PF) (DILAUDID) injection 1 mg  1 mg IntraVENous Q2H PRN    ondansetron (ZOFRAN) injection 4 mg  4 mg IntraVENous Q4H PRN    heparin (porcine) injection 5,000 Units  5,000 Units SubCUTAneous Q8H    scopolamine (TRANSDERM-SCOP) 1.5 mg  1.5 mg TransDERmal Q72H    hyoscyamine (LEVSIN) 0.125mg/mL solution 125 mcg  125 mcg Oral Q4H PRN    LORazepam (ATIVAN) injection 1 mg  1 mg IntraVENous Q6H PRN      No Known Allergies    Objective:  Vitals:    Vitals:    10/13/17 0307 10/13/17 0752 10/13/17 1201 10/13/17 1527   BP: 156/86 160/69 171/79 174/88   Pulse: 81 78 81 85   Resp: 18 18 16 18   Temp: 97.6 °F (36.4 °C) 97.6 °F (36.4 °C) 98.1 °F (36.7 °C) 98.1 °F (36.7 °C)   SpO2: 94% 95% 97% 98%   Weight: 105.8 kg (233 lb 4 oz)      Height:         Intake and Output:  10/13 0701 - 10/13 1900  In: 1399.2 [P.O.:360; I.V.:1039.2]  Out: 20 [Drains:20]  10/11 1901 - 10/13 0700  In: 470 [P.O.:120; I.V.:350]  Out: 79 [Drains:60]    Physical Examination:    General: obese  Neck:  Supple, no mass  Resp:  CTA  CV:  RRR,  no murmur  GI:  Post op, non tender   Neurologic:  Non focal  Ext                   LUE AVF   Skin:  No Rash      []    High complexity decision making was performed  []    Patient is at high-risk of decompensation with multiple organ involvement    Lab Data Personally Reviewed: I have reviewed all the pertinent labs, microbiology data and radiology studies during assessment. Recent Labs      10/13/17   0311   NA  136   K  4.7   CL  99   CO2  23   GLU  89   BUN  58*   CREA  12.70*   CA  7.8*   PHOS  8.2*   ALB  3.5     Recent Labs      10/13/17   0311   WBC  7.2   HGB  12.6   HCT  40.3   PLT  198     No results found for: SDES  Lab Results   Component Value Date/Time    Culture result: MRSA NOT PRESENT 10/12/2017 02:15 PM    Culture result:  10/12/2017 02:15 PM         Screening of patient nares for MRSA is for surveillance purposes and, if positive, to facilitate isolation considerations in high risk settings. It is not intended for automatic decolonization interventions per se as regimens are not sufficiently effective to warrant routine use.      Recent Results (from the past 24 hour(s))   RENAL FUNCTION PANEL    Collection Time: 10/13/17  3:11 AM   Result Value Ref Range    Sodium 136 136 - 145 mmol/L    Potassium 4.7 3.5 - 5.1 mmol/L    Chloride 99 97 - 108 mmol/L    CO2 23 21 - 32 mmol/L    Anion gap 14 5 - 15 mmol/L    Glucose 89 65 - 100 mg/dL    BUN 58 (H) 6 - 20 MG/DL    Creatinine 12.70 (H) 0.70 - 1.30 MG/DL    BUN/Creatinine ratio 5 (L) 12 - 20      GFR est AA 5 (L) >60 ml/min/1.73m2    GFR est non-AA 4 (L) >60 ml/min/1.73m2    Calcium 7.8 (L) 8.5 - 10.1 MG/DL    Phosphorus 8.2 (H) 2.6 - 4.7 MG/DL    Albumin 3.5 3.5 - 5.0 g/dL   CBC W/O DIFF    Collection Time: 10/13/17  3:11 AM   Result Value Ref Range    WBC 7.2 4.1 - 11.1 K/uL    RBC 4.15 4.10 - 5.70 M/uL    HGB 12.6 12.1 - 17.0 g/dL    HCT 40.3 36.6 - 50.3 %    MCV 97.1 80.0 - 99.0 FL    MCH 30.4 26.0 - 34.0 PG    MCHC 31.3 30.0 - 36.5 g/dL    RDW 14.8 (H) 11.5 - 14.5 %    PLATELET 260 154 - 113 K/uL           I have reviewed the flowsheets. Chart and Pertinent Notes have been reviewed. No change in PMH ,family and social history from Consult note.       Agustina Hutchinson MD

## 2017-10-13 NOTE — PROGRESS NOTES
CM reviewed chart, met with patient, explained role and discussed discharge planning. Patient lives with his wife in [place], [place]. He is independent without any assistive devices. He is receiving disability benefits -[employer]. He has ESRD, and goes to [facility] in [facility] on M[facility]W-F. He uses his local [facility] pharmacy for prescriptions and his wife will provide transportation home. Patient did not voice any discharge barriers. Alex Sidhu MSA, RN, CRM. Care Management Interventions  PCP Verified by CM: Yes  Mode of Transport at Discharge:  Other (see comment)  Transition of Care Consult (CM Consult): Discharge Planning  MyChart Signup: No  Discharge Durable Medical Equipment: No  Health Maintenance Reviewed: Yes  Physical Therapy Consult: No  Occupational Therapy Consult: No  Current Support Network: Lives with Spouse  Confirm Follow Up Transport: Family  Plan discussed with Pt/Family/Caregiver: Yes  Discharge Location  Discharge Placement: Home with family assistance

## 2017-10-13 NOTE — ROUTINE PROCESS
[facility] called and patient was not on the schedule for today until I called them at 12. Patient will be dialyzed at 1800 and lidocaine ordered by Dr. Christianne Harmon

## 2017-10-13 NOTE — DIALYSIS
[facility]                         [phone]  Vitals Pre Post Assessment Pre Post   /93 96/68 LOC A & O x4 A & O x4   HR 87 106 Lungs CTA CTA   Temp 98.6 98.4 Cardiac Regular rate and rhythm on monitor Regular rate and rhythm on monitor   Resp 16 20 Skin Warm and dry; hands cool Warm and dry; hands cool   Weight 110.6 kg 107.2 kg Edema 1+ in feet Trace in feet      Pain 5/10 incisional pain 3/10 incisional pain     Orders   Duration: Start: 1733 End: 2133 Total: 4 hours   Dialyzer: Revaclear   K Bath: 2   Ca Bath: 2.5   Na / Bicarb: 140 / 35   Target Fluid Removal: 2,500 mL     Access   Type & Location: L UA AVF   Comments:                            Site with no s/s of infection.  + B/T.  Lidocaine cream 4% applied approximately one hour prior to access. Aseptic prep done. Cannulated x2 with 15g needles. Both sites with + flash; aspirated and flushed easily. Needles and tubing secured well. Labs   Hep B status / date: HBsAg negative 9/25/17   Obtained/Reviewed  Critical Results Called Reviewed   n/a     Meds Given   Name Dose Route   Lidocaine cream 4% One application Topical                Total Liters Process: 91.0 L   Net Fluid Removed: 2,500 mL      Comments   Time Out Done: 0659  KK   Primary Nurse Rpt Pre: Cheyanne Cadena RN   Primary Nurse Rpt Post: Tonia Monterroso RN   Pt Education: Removing pressure dressings after 4 hours   Care Plan: Continue HD treatments as ordered by nephrologists   Tx Summary: Tolerated HD treatment well. At end of treatment, all possible blood rinsed back from circuit. Needles removed, and pressure applied to each cannulation site until all bleeding stopped. Hemostasis achieved within 10 minutes for each cannulation site. Pressure dressings applied and secured well.  + B/T. Remains in room 448. Bed in low position, rails up. Call light in reach.

## 2017-10-14 VITALS
HEIGHT: 66 IN | RESPIRATION RATE: 16 BRPM | DIASTOLIC BLOOD PRESSURE: 56 MMHG | BODY MASS INDEX: 36.32 KG/M2 | HEART RATE: 103 BPM | OXYGEN SATURATION: 97 % | WEIGHT: 225.97 LBS | SYSTOLIC BLOOD PRESSURE: 105 MMHG | TEMPERATURE: 98.4 F

## 2017-10-14 PROCEDURE — 74011250636 HC RX REV CODE- 250/636: Performed by: SURGERY

## 2017-10-14 PROCEDURE — 74011250637 HC RX REV CODE- 250/637: Performed by: SURGERY

## 2017-10-14 RX ADMIN — HYDROMORPHONE HYDROCHLORIDE 2 MG: 2 TABLET ORAL at 09:00

## 2017-10-14 RX ADMIN — Medication 10 ML: at 07:13

## 2017-10-14 RX ADMIN — HEPARIN SODIUM 5000 UNITS: 5000 INJECTION, SOLUTION INTRAVENOUS; SUBCUTANEOUS at 07:12

## 2017-10-14 NOTE — PROGRESS NOTES
Discharge instructions reviewed with patient, opportunity for questions provided. Pt verbalized understanding. Pt discharged home.

## 2017-10-14 NOTE — PROGRESS NOTES
Progress Note    Patient: Mervin Christensen. MRN: [id number]  SSN: [id number]    YOB: 1975  Age: 38 y.o. Sex: male      Admit Date: 10/12/2017    2 Days Post-Op    Procedure:  Procedure(s):  LAPAROSCOPIC GASTRECTOMY SLEEVE    ESOPHAGOGASTRODUODENOSCOPY (EGD)    Subjective:     Patient has no new complaints. Feels good and had dialysis yesterday. Tolerating bariatric liquids 4 oz / hour; no nausea, no vomiting. BM x 2; up and walking; pain well controlled   No fever or chills, chest pain or shortness of breath. Objective:     Visit Vitals    /56 (BP Patient Position: Sitting)    Pulse (!) 103    Temp 98.4 °F (36.9 °C)    Resp 16    Ht 5' 5.5\" (1.664 m)    Wt 225 lb 15.5 oz (102.5 kg)    SpO2 97%    BMI 37.03 kg/m2       Temp (24hrs), Av.6 °F (37 °C), Min:98.1 °F (36.7 °C), Max:99.1 °F (37.3 °C)      Physical Exam:    A + O x 3, sitting up in chair talking with nurse   Chest  CTA  COR  RRR  ABD Soft, DENNISE with scant ss drainage; tender was appropriate; ND, +BS, no masses or hernias. EXT No edema; ambulating independently    Data Review: reviewed  Nursing documentation and I & O    Lab Review: All lab results for the last 24 hours reviewed.     Assessment:     Hospital Problems  Date Reviewed: 10/12/2017          Codes Class Noted POA    Morbid [facility] ([facility] ICD-10-CM: E66.01  ICD-9-CM: 278.01  2017 Unknown              Plan/Recommendations/Medical Decision Making:     doing well and ready for dc home    DC DENNISE   Reviewed post op diet, medications, follow up and restrictions   Post op call Monday   Resume dialysis schedule M// at [facility]   Bariatric liquid diet   Follow up in office 2 weeks has appt     Signed By: Lay Rock NP     2017

## 2017-10-14 NOTE — PROGRESS NOTES
Problem: Gastric Sleeve Pathway / Bariatric Revision Pathway: Post-Op Day 1  Goal: *Optimal pain control at patients stated goal  Outcome: Progressing Towards Goal  Pt denies pain     Problem: Falls - Risk of  Goal: *Absence of Falls  Document Monique Fall Risk and appropriate interventions in the flowsheet.    Outcome: Progressing Towards Goal  Fall Risk Interventions:              Medication Interventions: Patient to call before getting OOB                       Problem: Acute Renal Failure: Day 2  Goal: *Urinary output within identified parameters  Outcome: Progressing Towards Goal  Oliguric-- patient's baseline

## 2017-10-14 NOTE — PROGRESS NOTES
Bedside shift change report given to April (oncoming nurse) by Bridget Hardy (offgoing nurse).  Report included the following information SBAR, Intake/Output, MAR, Recent Results and Cardiac Rhythm NSR-ST.

## 2017-10-16 ENCOUNTER — TELEPHONE (OUTPATIENT)
Dept: SURGERY | Age: 42
End: 2017-10-16

## 2017-10-16 NOTE — TELEPHONE ENCOUNTER
Bariatric Post-Operative Phone Calls: 48 hour phone call    Diet:Question of any nausea and/or vomiting. Protein intake (goal is 60 grams of protein daily)   Poor____Fair____Good__X__Great____     Comment:______________________________________________________________      ______________________________________________________________________    Hydration:Less than 32 ounces of water daily is fair to poor (Goal is 64 ounces per day)   Poor____ Fair_X___ Good____Great____    Comment:____patient is on dialysis intakes about 32 ounces a day of water__________________________________________________________    ______________________________________________________________________      Ambulation:( walking at least 3 x week, for 15- 20 minutes)     Poor______ Fair______ Good_X_____     Great______ Comment:__________________________________________________    ______________________________________________________________________      Urine Color: Question of any odor and color(should be magaly, pale, and clear) Dark______ Amber______ Pale______      Clear__X____ Comment:__patient currently on dialysis does not urinate much but it is clear when he does no changes_________________________________________________                           ________________________________________________________________    Bowel movements: Question of any constipation- haven't had any bowel movements for more than 3 days. This could be related to protein intake and/or narcotic pain medication usage. Comment:                                                                                     Per patient he has been moving bowels since discharge                                         Pain: Left sided abdominal pain is normal (should be less than 3)  Question if pain medication is helpful.  10___ 9___ 8___ 7___ 6___ 5___ 4___ 3___     2___1_X__0___Comment: C/O abd tenderness about a 1/10 when sitting when mobile about 2-3/10_________________________________________________    ______________________________________________________________________      Incision: (No redness, pain, swelling or fever) Healing Well___X___     Healed______Redness_________ Pain_________     Swelling_________ Fever__________(greater than 101 needs evaluation)    Comment:____________________________________________________________    ______________________________________________________________________  Use of incentive spirometer: Yes__X__       No           Next Appointment:____10/26/17__________                 Support Group: Yes______No_X_____    Additional Comments:____________________________________________________________    ____________________________________________________________________      If more than one parameter is not met or considered poor, nurse needs to discuss with provider recommend for patient to be seen in the office as soon as possible or refer to the provider for follow-up. Reinforce to patient to use bariatric educational booklet as guide. It is appropriate to refer patient to the nutritionist to discuss more in detail of diet and nutrition.

## 2017-10-16 NOTE — TELEPHONE ENCOUNTER
----- Message from Simeon Amezcua sent at 10/16/2017  9:23 AM EDT -----  Regarding: Discharge Phone Call  Hello,    Please call Mr. Arturo Jaimes for his discharge phone call.

## 2017-10-17 NOTE — DISCHARGE SUMMARY
Physician Discharge Summary     Patient ID:  Ellis Rivas  [id number]  38 y.o.  1975    Admit Date: 10/12/2017    Discharge Date: 10/14/2017    * Admission Diagnoses: MORBID OBESITY ; Morbid obesity ([facility]    * Discharge Diagnoses:    Hospital Problems as of 10/14/2017  Date Reviewed: 10/12/2017          Codes Class Noted - Resolved POA    Morbid obesity ([facility] ICD-10-CM: E66.01  ICD-9-CM: 278.01  2/9/2017 - Present Unknown               Admission Condition: Good    * Discharge Condition: good    * Procedures: Procedure(s):  LAPAROSCOPIC GASTRECTOMY SLEEVE    ESOPHAGOGASTRODUODENOSCOPY (EGD)    * Hospital Course:   Normal hospital course for this procedure. Tolerated hemodialysis on POD#1 without sequelae. Consults: Nephrology    Significant Diagnostic Studies: N/A    * Disposition: Home    Discharge Medications:   Discharge Medication List as of 10/14/2017 10:20 AM      START taking these medications    Details   HYDROmorphone (DILAUDID) 2 mg tablet Take 1-2 Tabs by mouth every four (4) hours as needed. Max Daily Amount: 24 mg., Print, Disp-30 Tab, R-0         CONTINUE these medications which have NOT CHANGED    Details   ferric citrate (AURYXIA) 210 mg iron tablet Take  by mouth three (3) times daily (with meals). AND 2 TABLETS WITH SNACKS, Historical Med      Omeprazole delayed release (PRILOSEC D/R) 20 mg tablet Take 1 Tab by mouth daily. Daily after surgery, Normal, Disp-30 Tab, R-1      ondansetron (ZOFRAN ODT) 4 mg disintegrating tablet Take 1 Tab by mouth every eight (8) hours as needed for Nausea. After surgery, Normal, Disp-20 Tab, R-0      hyoscyamine SL (LEVSIN/SL) 0.125 mg SL tablet 1 Tab by SubLINGual route every four (4) hours as needed for Cramping. After surgery as needed for pressure and spasm upper abdomen / chest, Normal, Disp-30 Tab, R-0             * Follow-up Care/Patient Instructions:   Activity: No heavy lifting, pushing, pulling x 4 weeks  Diet: full liquids with renal restrictions  Wound Care: Keep wound clean and dry    Follow-up Information     Follow up With Details Comments [address], [place]   [address] [facility]  [address] [facility] [address]      Blake Cleveland NP On 10/26/2017 10 am [address] [address] [address] [facility] [address]  [phone]          Future Appointments  Date Time Provider Hollie Gracia   10/26/2017 10:00 AM REYNOLD Maguire SCHED   11/9/2017 10:20 AM REYNOLD Maguire SCHED   11/28/2017 10:00 AM REYNOLD Maguire SCHED         Signed:  Minoo Wiseman MD  10/16/2017  8:09 PM

## 2017-10-26 ENCOUNTER — OFFICE VISIT (OUTPATIENT)
Dept: SURGERY | Age: 42
End: 2017-10-26

## 2017-10-26 VITALS
SYSTOLIC BLOOD PRESSURE: 110 MMHG | BODY MASS INDEX: 34.87 KG/M2 | OXYGEN SATURATION: 94 % | HEIGHT: 66 IN | RESPIRATION RATE: 18 BRPM | HEART RATE: 52 BPM | DIASTOLIC BLOOD PRESSURE: 62 MMHG | TEMPERATURE: 98.1 F | WEIGHT: 217 LBS

## 2017-10-26 DIAGNOSIS — Z98.84 HISTORY OF WEIGHT LOSS SURGERY: ICD-10-CM

## 2017-10-26 DIAGNOSIS — E66.01 SEVERE OBESITY (BMI 35.0-35.9 WITH COMORBIDITY) (HCC): ICD-10-CM

## 2017-10-26 DIAGNOSIS — Z09 FOLLOW-UP EXAMINATION FOLLOWING SURGERY: Primary | ICD-10-CM

## 2017-10-26 RX ORDER — LANOLIN ALCOHOL/MO/W.PET/CERES
500 CREAM (GRAM) TOPICAL DAILY
COMMUNITY

## 2017-10-26 RX ORDER — GLUCOSAMINE SULFATE 1500 MG
POWDER IN PACKET (EA) ORAL DAILY
COMMUNITY

## 2017-10-26 RX ORDER — BISMUTH SUBSALICYLATE 262 MG
1 TABLET,CHEWABLE ORAL DAILY
COMMUNITY

## 2017-10-26 NOTE — PROGRESS NOTES
1. Have you been to the ER, urgent care clinic since your last visit? Hospitalized since your last visit?  2 wks surgery [facility]    2. Have you seen or consulted any other health care providers outside of the [address] [place] [address] since your last visit? Include any pap smears or colon screening.    no

## 2017-10-26 NOTE — MR AVS SNAPSHOT
Visit Information Date & Time Provider Department Dept. Phone Encounter #  
 10/26/2017 10:00 AM Refugia Beverage, NP [facility] [place] [address] [phone] [id number] Your Appointments 11/9/2017 10:20 AM  
POST OP 10 MIN with Refugia Beverage, NP  
[facility] 22 177 ([address]) Appt Note: 4week 217 [address] [id number]  
[address] [id number]  
  
    
 11/28/2017 10:00 AM  
POST OP 10 MIN with Refugia Beverage, NP  
[facility] 22 391 ([address]) Appt Note: 6week 217 [address] [place] [address] [id number] [phone] Upcoming Health Maintenance Date Due HEMOGLOBIN A1C Q6M 1973 FOOT EXAM Q1 9/17/1983 MICROALBUMIN Q1 9/17/1983 EYE EXAM RETINAL OR DILATED Q1 9/17/1983 Pneumococcal 19-64 Highest Risk (1 of 3 - PCV13) 9/17/1992 DTaP/Tdap/Td series (1 - Tdap) 9/17/1994 INFLUENZA AGE 9 TO ADULT 8/1/2017 LIPID PANEL Q1 9/28/2018 Allergies as of 10/26/2017  Review Complete On: 10/26/2017 By: Prem Potts LPN No Known Allergies Current Immunizations  Reviewed on 10/12/2017 No immunizations on file. Not reviewed this visit Vitals BP Pulse Temp Resp Height(growth percentile) Weight(growth percentile) 110/62 (!) 52 98.1 °F (36.7 °C) 18 5' 5.5\" (1.664 m) 217 lb (98.4 kg) SpO2 BMI Smoking Status 94% 35.56 kg/m2 Never Smoker Vitals History BMI and BSA Data Body Mass Index Body Surface Area 35.56 kg/m 2 2.13 m 2 Preferred Pharmacy Pharmacy Name Phone [id number], [address] [facility] [phone] Your Updated Medication List  
  
   
This list is accurate as of: 10/26/17 10:44 AM.  Always use your most recent med list.  
  
  
  
  
 AURYXIA 210 mg iron tablet Generic drug:  ferric citrate Take  by mouth three (3) times daily (with meals). AND 2 TABLETS WITH SNACKS  
  
 cyanocobalamin 500 mcg tablet Commonly known as:  VITAMIN B12 Take 500 mcg by mouth daily. multivitamin tablet Commonly known as:  ONE A DAY Take 1 Tab by mouth daily. Omeprazole delayed release 20 mg tablet Commonly known as:  PRILOSEC D/R Take 1 Tab by mouth daily. Daily after surgery VITAMIN D3 1,000 unit Cap Generic drug:  cholecalciferol Take  by mouth daily. Patient Instructions What you need to know: 1. Advance your diet to soft foods. Follow the handout that you were given today in the office. 2.  Take the recommended vitamins daily 3. No lifting greater than 20 lbs. 4.  You can do light jogging and walking. 5  Follow up in 2 weeks. 6.  You may go into a pool. 7.  If you are not able to tolerate liquids or soft foods. Please call our office. [phone] 
8. If you have vomiting and persistent epigastric pain or chest pain. You should call our office, the doctor on-call or go to the emergency room. What to do if you are constipated: You may  take Milk of Magnesia. Take 2 Tablespoons followed by 16 oz of water then 2 hours later take another 2 tablespoons. If  milk of magnesia does not work then take Religion-Olivet or Miralax over the counter. Keep in mind that the Benefiber or Miralax may take a day or two to work. If all of the above do not work try a Fleets enema and follow the directions on the box. Soft and Mushy: Phase 1 Below is a list of basic items to purchase for the first phase of the  
soft mushy diet. Your surgeon or nurse practitioner will inform you when it is okay  
to advance to the next phase. Soft and Mushy Foods: Prepare food to the appropriate texture. ? Everything on clear and full liquid diet ? Applesauce (no sugar added) ? Hot & cold cereals (Cream of Wheat, Plain Cheerios®, Special K with protein®, plain oatmeal, grits) ? Frozen or canned vegetables (carrots, acorn squash, butternut squash, string beans, spinach, broccoli, cauliflower  florets only!) ? Canned fruit (in natural juice or with Splenda®) ? Fat-free, cholesterol-free egg substitute (P) ? Low-fat or fat-free cottage cheese (P) ? Low-fat or fat-free yogurt (P) ? Low-fat or fat-free [place] yogurt (P) ? Fat-free milk or 1% milk (P) ? Lactaid fat-free or 1% low fat milk (P) ? Low-fat well-cooked/soft beans (the consistency of refried beans) (P) ? No sugar added, low fat pudding (no pistachio or other flavor containing nuts) ? low-fat cream soups ? Low-fat chicken noodle or chicken rice soup (P) ? Sugar-free fudgesicles ? Sugar-free cocoa ? Fat free whipped or mashed potatoes ? Herbs and spices ? Lite butter, margarine, canola oil, olive oil, reduced-fat or fat-free mayonnaise, reduced-fat or fat-free salad dressing, reduced-fat or fat-free cream cheese, reduced-fat or fat-free sour cream.  
 
 
 P designates food sources of protein. Include a protein at each meal.  
 
If a food does not contain protein, you may want to consider adding protein powder to the food to give it extra protein. For example, mix protein powder in with the following: oatmeal, mashed potatoes, sugar-free pudding, sugar-free gelatin (see recipes in book), no-sugar-added applesauce. Soft Mushy Diet: Phase 1 Time Meal or Snack Soft/Mushy Food Amount (ounces) Protein 
(g) Supplement 6:30 am Sip on Fluids Sip on non-carbonated, calorie-free, no sugar added liquids. 8 oz 
 0 g Take Multivitamin containing 18 mg ferrous sulfate (iron) 7:00 am   Stop drinking fluids 30 minutes before breakfast  
7:30 am Breakfast ½ cup sugar-free oatmeal with 1 scoop of protein powder. Add cinnamon, nutmeg, artificial sweeteners as desired for flavor. 4 oz  
 20-25 g   
9:00 Snack (optional) High Protein Gelatin (see recipe in book) 4oz 10 g   
11:30 am Stop drinking liquids 30 minutes before lunch 12:00 pm Lunch Sip low-fat cream of potato soup or low-fat cream of chicken soup mixed with 1 scoop of protein powder 8 oz soup 25 g Take 400 mg calcium citrate 2:00 Snack (optional) ½ cup high protein pudding (see recipe in book) or ½ cup low-fat cottage cheese or yogurt. Can also add protein powder as needed. 4 oz 14 g 
 
or 
 
5 g   
 
3:00  5:30 pm  
Sip on Fluids Sip on non-carbonated, calorie-free, no sugar added liquids. 24 - 32 oz  
0 g Take 400 mg calcium citrate. 6:00 pm Dinner ¼ cup low-fat, well cooked beans (ex. black beans, low-fat refried beans) ¼ cup no-sugar-added applesauce 4 oz 3.5 g Take 400 mg of calcium citrate. 7:00 - 10:00 pm Sip on Fluids Sip on non-carbonated, no sugar added liquids as needed  16-24 oz 0 g Take Multivitamin with 18 mg ferrous sulfate Total:  80 oz clear fluids 63-77 
grams 2 Multivitamins with 18 mg ferrous sulfate, 8537-2057 mg calcium citrate Introducing [facility]! Asaf Erickson introduces Logly patient portal. Now you can access parts of your medical record, email your doctor's office, and request medication refills online. 1. In your internet browser, go to [email or web address] 2. Click on the First Time User? Click Here link in the Sign In box. You will see the New Member Sign Up page. 3. Enter your Logly Access Code exactly as it appears below. You will not need to use this code after youve completed the sign-up process. If you do not sign up before the expiration date, you must request a new code. · Logly Access Code: [email or web address] Expires: 12/27/2017  9:42 AM 
 
4. Enter the last four digits of your Social Security Number (xxxx) and Date of Birth (mm/dd/yyyy) as indicated and click Submit. You will be taken to the next sign-up page. 5. Create a Vidatronic ID. This will be your Vidatronic login ID and cannot be changed, so think of one that is secure and easy to remember. 6. Create a Vidatronic password. You can change your password at any time. 7. Enter your Password Reset Question and Answer. This can be used at a later time if you forget your password. 8. Enter your e-mail address. You will receive e-mail notification when new information is available in [address] 9. Click Sign Up. You can now view and download portions of your medical record. 10. Click the Download Summary menu link to download a portable copy of your medical information. If you have questions, please visit the Frequently Asked Questions section of the Vidatronic website. Remember, Vidatronic is NOT to be used for urgent needs. For medical emergencies, dial 911. Now available from your iPhone and Android! Please provide this summary of care documentation to your next provider. Your primary care clinician is listed as Chao Hughes. If you have any questions after today's visit, please call [phone].

## 2017-10-26 NOTE — PATIENT INSTRUCTIONS
What you need to know:  1. Advance your diet to soft foods. Follow the handout that you were given today in the office. 2.  Take the recommended vitamins daily  3. No lifting greater than 20 lbs. 4.  You can do light jogging and walking. 5  Follow up in 2 weeks. 6.  You may go into a pool. 7.  If you are not able to tolerate liquids or soft foods. Please call our office. [phone]  8. If you have vomiting and persistent epigastric pain or chest pain. You should call our office, the doctor on-call or go to the emergency room. What to do if you are constipated: You may  take Milk of Magnesia. Take 2 Tablespoons followed by 16 oz of water then 2 hours later take another 2 tablespoons. If  milk of magnesia does not work then take Delphia-[place] or Miralax over the counter. Keep in mind that the Benefiber or Miralax may take a day or two to work. If all of the above do not work try a Fleets enema and follow the directions on the box. Soft and Mushy: Phase 1    Below is a list of basic items to purchase for the first phase of the   soft mushy diet. Your surgeon or nurse practitioner will inform you when it is okay   to advance to the next phase. Soft and Mushy Foods: Prepare food to the appropriate texture. ? Everything on clear and full liquid diet  ? Applesauce (no sugar added)  ? Hot & cold cereals (Cream of Wheat, Plain Cheerios®, Special K with protein®, plain oatmeal, grits)  ? Frozen or canned vegetables (carrots, acorn squash, butternut squash, string beans, spinach, broccoli, cauliflower - florets only!)   ? Canned fruit (in natural juice or with Splenda®)  ? Fat-free, cholesterol-free egg substitute (P)  ? Low-fat or fat-free cottage cheese (P)  ? Low-fat or fat-free yogurt (P)  ? Low-fat or fat-free [place] yogurt (P)  ? Fat-free milk or 1% milk (P)  ? Lactaid fat-free or 1% low fat milk (P)  ? Low-fat well-cooked/soft beans (the consistency of refried beans) (P)  ?  No sugar added, low fat pudding (no pistachio or other flavor containing nuts)  ? low-fat cream soups  ? Low-fat chicken noodle or chicken rice soup (P)  ? Sugar-free fudgesicles  ? Sugar-free cocoa  ? Fat free whipped or mashed potatoes   ? Herbs and spices  ? Lite butter, margarine, canola oil, olive oil, reduced-fat or fat-free mayonnaise, reduced-fat or fat-free salad dressing, reduced-fat or fat-free cream cheese, reduced-fat or fat-free sour cream.        P designates food sources of protein. Include a protein at each meal.     If a food does not contain protein, you may want to consider adding protein powder to the food to give it extra protein. For example, mix protein powder in with the following: oatmeal, mashed potatoes, sugar-free pudding, sugar-free gelatin (see recipes in book), no-sugar-added applesauce. Soft Mushy Diet: Phase 1  Time Meal or Snack Soft/Mushy Food Amount (ounces) Protein  (g) Supplement   6:30 am Sip on Fluids Sip on non-carbonated, calorie-free, no sugar added liquids. 8 oz   0 g Take Multivitamin containing 18 mg ferrous sulfate (iron)    7:00 am   Stop drinking fluids 30 minutes before breakfast   7:30 am Breakfast ½ cup sugar-free oatmeal with 1 scoop of protein powder. Add cinnamon, nutmeg, artificial sweeteners as desired for flavor. 4 oz    20-25 g    9:00 Snack  (optional) High Protein Gelatin (see recipe in book) 4oz 10 g    11:30 am Stop drinking liquids 30 minutes before lunch   12:00 pm Lunch Sip low-fat cream of potato soup or low-fat cream of chicken soup mixed with 1 scoop of protein powder 8 oz soup 25 g Take 400 mg calcium citrate   2:00 Snack  (optional) ½ cup high protein pudding (see recipe in book)   or   ½ cup low-fat cottage cheese or yogurt. Can also add protein powder as needed. 4 oz 14 g    or    5 g      3:00 - 5:30 pm   Sip on Fluids   Sip on non-carbonated, calorie-free, no sugar added liquids. 24 - 32 oz   0 g   Take 400 mg calcium citrate. 6:00 pm Dinner ¼ cup low-fat, well cooked beans (ex. black beans, low-fat refried beans)  ¼ cup no-sugar-added applesauce 4 oz 3.5 g Take 400 mg of calcium citrate.    7:00 - 10:00 pm Sip on Fluids Sip on non-carbonated, no sugar added liquids as needed  16-24 oz 0 g Take Multivitamin with 18 mg ferrous sulfate   Total:  80 oz clear fluids 63-77  grams 2 Multivitamins with 18 mg ferrous sulfate, 6620-2878 mg calcium citrate

## 2017-10-30 PROBLEM — Z98.84 HISTORY OF WEIGHT LOSS SURGERY: Status: ACTIVE | Noted: 2017-10-30

## 2017-10-30 NOTE — PROGRESS NOTES
Chief Complaint   Patient presents with    Surgical Follow-up     2 weeks s/p sleeve gastrectomy by Dr Teresa Patterson. down 27.5 pounds. Reina Roger is 2 weeks status post Sleeve gastrectomy for treatment of morbid obesity. Presents today for obesity management. Patient has lost 27.5 lbs. Since surgery. Patient is satisfied with progress. Patient is consuming 60+ grams of protein daily. Patient is drinking 24 oz of fluids per day. He has resumed usual dialysis regimen and they are \"pulling off less\"   Bowels moving daily. No reflux, night-time cough, heartburn or regurgitation. No fever or chills, chest pain or shortness of breath. Vitamin compliance yes  Activity  Walking 6 miles per day     Physical Exam  Visit Vitals    /62    Pulse (!) 52    Temp 98.1 °F (36.7 °C)    Resp 18    Ht 5' 5.5\" (1.664 m)    Wt 217 lb (98.4 kg)    SpO2 94%    BMI 35.56 kg/m2     A + O x 3  Chest  CTA, unlabored   COR  RRR  ABD Soft, lap sites are C/D/I and no erythema or induration; obese, NT/ND, no masses or hernias   EXT No edema; ambulating independently       ICD-10-CM ICD-9-CM    1. Follow-up examination following surgery Z09 V67.00    2. BMI 35.0-35.9,adult Z68.35 V85.35    3. Severe obesity (BMI 35.0-35.9 with comorbidity) ([facility]) E66.01 278.01     Z68.35 V85.35    4. History of weight loss surgery Z98.84 V45.86        Reina Roger is 2 weeks s/p Sleeve gastrectomy for treatment of morbid obesity  doing well   Diet soft stage I   Continue vitamins and protein   Activity continue daily walking   Follow-up in 2 weeks  Support group  Reina Beckman. verbalized understanding and questions were answered to the best of my knowledge and ability. Diet  educational materials were provided.

## 2017-11-02 ENCOUNTER — TELEPHONE (OUTPATIENT)
Dept: SURGERY | Age: 42
End: 2017-11-02

## 2017-11-02 NOTE — TELEPHONE ENCOUNTER
----- Message from Fabiana Whitley LPN sent at 36/48/6430 10:49 AM EDT -----  Regarding: 3 week post op call FW: Discharge Phone Call  Lloyd Hoffman danette 10/12/17  ----- Message -----     From: Bennie Morgan     Sent: 10/16/2017   9:23 AM       To: Jerica Barron LPN, Fabiana Whitley LPN  Subject: Discharge Phone Call                             Hello,    Please call Mr. Lindquisthelder Patelyanely for his discharge phone call. Leonard Calvin put in a reminder for his 3 week call. Thank you!   Radha

## 2017-11-02 NOTE — TELEPHONE ENCOUNTER
Bariatric Post-Operative Phone Calls: Week 3    Diet:Question of any nausea and/or vomiting. Question of tolerance to diet advancement from liquids to solids. Protein intake (goal is 60 grams of protein daily)   Poor____Fair____Good____Great__x__     Comment:______________________________________________________________      ______________________________________________________________________    Hydration:Less than 32 ounces of water daily is fair to poor (Goal is 64 ounces per day)   Poor____ Fair____ Good____Great__xx__    Comment:______________________________________________________________    ______________________________________________________________________      Ambulation:( walking at least 3 x week, for at least 30 minutes)   Poor______ Fair______ Good______     Great__x____ Comment:__________________________________________________    ______________________________________________________________________      Urine Color: Question of any odor and color(should be magaly, pale, and clear) Dark______ Amber______ Pale______      Clear______ Comment:___diay\lysis patient________________________________________________                           ________________________________________________________________    Bowel movements: Question of any constipation- haven't had any bowel movements for more than 3 days. This could be related to protein intake and/or narcotic pain medication usage. Comment:                                                                               okay                                               Pain: Left sided abdominal pain is normal (should be less than 3)         Question if pain medication is helpful.  10___ 9___ 8___ 7___ 6___ 5___ 4___ 3___     2___1___0_x__Comment:_________________________________________________    ______________________________________________________________________      Incision: (No redness, pain, swelling or fever) Healing Well__x____ Healed______Redness_________ Pain_________     Swelling_________ Fever__________(greater than 101 needs evaluation)    Comment:____________________________________________________________    ______________________________________________________________________  Use of incentive spirometer: Yes_x___       No          Next Appointment:_11/9/17_____________                 Support Group: Yes______No____x__    Additional Comments:____________________________________________________________    ____________________________________________________________________      If more than one parameter is not met or considered poor, nurse needs to discuss with provider recommend for patient to be seen in the office as soon as possible or refer to the provider for follow-up. Reinforce to patient to use bariatric educational booklet as guide. It is appropriate to refer patient to the nutritionist to discuss more in detail of diet and nutrition.

## 2017-11-09 ENCOUNTER — OFFICE VISIT (OUTPATIENT)
Dept: SURGERY | Age: 42
End: 2017-11-09

## 2017-11-09 VITALS
RESPIRATION RATE: 20 BRPM | DIASTOLIC BLOOD PRESSURE: 72 MMHG | HEART RATE: 81 BPM | HEIGHT: 66 IN | BODY MASS INDEX: 32.95 KG/M2 | TEMPERATURE: 97.9 F | WEIGHT: 205 LBS | OXYGEN SATURATION: 96 % | SYSTOLIC BLOOD PRESSURE: 114 MMHG

## 2017-11-09 DIAGNOSIS — Z09 FOLLOW-UP EXAMINATION FOLLOWING SURGERY: Primary | ICD-10-CM

## 2017-11-09 DIAGNOSIS — E66.9 OBESITY (BMI 30.0-34.9): ICD-10-CM

## 2017-11-09 NOTE — PROGRESS NOTES
Chief Complaint   Patient presents with    Surgical Follow-up     4wks s/p lap sleeve gastrectomy down 38.5lbs       Matilde Primes. is 4 weeks status post Sleeve gastrectomy for treatment of morbid obesity. Presents today for obesity management. Patient has lost 38.5 lbs. Since surgery. Patient is satisfied with progress. Patient is consuming about 55-75 grams of protein daily. Patient is drinking 32 oz of fluids per day. Bowels moving daily. He reports his dialysis doctor is \"real pleased\" and his levels are \"good\". He is making a bit more urine daily. No fever or chills, chest pain or shortness of breath. No reflux, night-time cough, heartburn or regurgitation. Vitamin compliance yes  Activity  Walking 3 miles per day     Physical Exam  Visit Vitals    /72 (BP 1 Location: Left arm, BP Patient Position: Sitting)    Pulse 81    Temp 97.9 °F (36.6 °C) (Oral)    Resp 20    Ht 5' 5.5\" (1.664 m)    Wt 205 lb (93 kg)    SpO2 96%    BMI 33.59 kg/m2     A + O x 3  Chest  CTA, unlabored   COR  RRR  ABD Soft, obese, NT/ND, no masses or hernias   EXT No edema; ambulating independently       ICD-10-CM ICD-9-CM    1. Follow-up examination following surgery Z09 V67.00    2. Obesity (BMI 30.0-34. 9) E66.9 278.00    3. BMI 33.0-33.9,adult Z68.33 V85.33        Matilde Primes. is 4 weeks s/p Sleeve gastrectomy for treatment of morbid obesity    doing well   Diet stage II soft   Continue vitamins and protein supplements   Activity daily walking   Follow-up in 2 weeks  Support group  Matilde Primes. verbalized understanding and questions were answered to the best of my knowledge and ability. Diet  educational materials were provided. 15 minutes spent in face to face with Matilde Primes. > 50% counseling.

## 2017-11-09 NOTE — MR AVS SNAPSHOT
Visit Information Date & Time Provider Department Dept. Phone Encounter #  
 11/9/2017 10:20 AM Luis Schmidt NP [facility] [address] [phone] [id number] Your Appointments 11/28/2017 10:00 AM  
POST OP 10 MIN with Luis Schmidt NP  
[facility] [address] [address] ([address]) Appt Note: 6week [address] [facility] [address] [facility] [address]  
[address] [facility] Upcoming Health Maintenance Date Due HEMOGLOBIN A1C Q6M 1973 FOOT EXAM Q1 9/17/1983 MICROALBUMIN Q1 9/17/1983 EYE EXAM RETINAL OR DILATED Q1 9/17/1983 Pneumococcal 19-64 Highest Risk (1 of 3 - PCV13) 9/17/1992 DTaP/Tdap/Td series (1 - Tdap) 9/17/1994 Influenza Age 5 to Adult 8/1/2017 LIPID PANEL Q1 9/28/2018 Allergies as of 11/9/2017  Review Complete On: 11/9/2017 By: Luis Schmidt NP No Known Allergies Current Immunizations  Reviewed on 10/12/2017 No immunizations on file. Not reviewed this visit Vitals BP Pulse Temp Resp Height(growth percentile) Weight(growth percentile) 114/72 (BP 1 Location: Left arm, BP Patient Position: Sitting) 81 97.9 °F (36.6 °C) (Oral) 20 5' 5.5\" (1.664 m) 205 lb (93 kg) SpO2 BMI Smoking Status 96% 33.59 kg/m2 Never Smoker BMI and BSA Data Body Mass Index Body Surface Area  
 33.59 kg/m 2 2.07 m 2 Preferred Pharmacy Pharmacy Name Phone [id number] [place], [address] [facility] [phone] Your Updated Medication List  
  
   
This list is accurate as of: 11/9/17 10:25 AM.  Always use your most recent med list.  
  
  
  
  
 AURYXIA 210 mg iron tablet Generic drug:  ferric citrate Take  by mouth three (3) times daily (with meals). AND 2 TABLETS WITH SNACKS  
  
 cyanocobalamin 500 mcg tablet Commonly known as:  VITAMIN B12 Take 500 mcg by mouth daily. multivitamin tablet Commonly known as:  ONE A DAY Take 1 Tab by mouth daily. Omeprazole delayed release 20 mg tablet Commonly known as:  PRILOSEC D/R Take 1 Tab by mouth daily. Daily after surgery VITAMIN D3 1,000 unit Cap Generic drug:  cholecalciferol Take  by mouth daily. Patient Instructions What you need to know: 
1 . Advance your diet to moist meats. Follow the handout that you were given today in the office. 2. Take the recommended vitamins daily 3 No lifting greater than 40 lbs. 4. You can do light jogging, moderate walking and a recumbent bike. 5 Follow up in 2 weeks. 6. You may go into a pool. 7. If you are not able to tolerate liquids, soft foods or moist meats. Please call our office. [phone] 
8. If you have vomiting and persistent epigastric pain or chest pain. You should call our office, the doctor on-call or go to the emergency room. What to do if you are constipated: You may  take Milk of Magnesia. Take 2 Tablespoons followed by 16 oz of water then 2 hours later take another 2 tablespoons. If  milk of magnesia does not work then take [place] or Miralax over the counter. Keep in mind that the Benefiber or Miralax may take a day or two to work. If all of the above do not work try a Fleets enema and follow the directions on the box. Shopping List Edda [place]en[place] Soft Mushy Diet: Phase 2 - Moist Meats Below is a list of moist meats that you can now introduce into your diet. Moist Meats: Prepare food to the appropriate texture using low-fat cooking  
methods ? Tuna packed in water (strain before eating) ? White flaky fish (martin, cod, flounder, tilapia, salmon) ? White chicken breast packed in water (strain before eating) ? 96-99% fat free thinly sliced deli meat (ham, turkey, roast beef) ? Fat free non-stick spray ? Silken Tofu ? Low-fat or vegetarian refried beans ? Well-cooked beans and lentils ? Skinless turkey or chicken (prepare to a soft texture) ? 93% lean pureed beef (round or sirloin only) ? Lean pork (cooked until very tender, cut into small pieces) ? Eggs (preferable egg whites) ? Egg substitutes ? Herbs and spices ? Lite butter, margarine, canola oil, olive oil, reduced-fat or fat-free kwon, reduced-fat or fat-free salad dressing, reduced-fat or fat-free cream cheese, reduced-fat or fat-free sour cream, lemon juice, salt, pepper, mustard, ketchup, salsa. See patient handbook for more low-fat cooking ideas. ? Be sure to use moist methods of cooking. Avoid microwaving meats because it can dry out the food making it harder to tolerate. Soft Mushy Diet: Phase 2 Time Meal or Snack Soft/Mushy Food Amount (ounces) Protein 
(g) Supplement 6:30 am Sip on Fluids Sip on non-carbonated, calorie-free, no sugar added liquids. 8 oz 
 0 g Take Multivitamin containing 18 mg ferrous sulfate (iron) 7:00 am   Stop drinking fluids 30 minutes before breakfast  
7:30 am Breakfast ¼ cup soft scrambled egg or egg substitute ¼ cup canned fruit (packed in natural juice, strained)  4 oz  
 7 g   
9:00 Snack (optional) ½ cup low-fat or fat-free yogurt  
or ½ cup cottage cheese  4oz 4g 
or 14 g   
11:30 am Stop drinking liquids 30 minutes before lunch 12:00 pm Lunch ¼ cup low-fat or lean deli meat 
with ¼ well cooked green beans 4 oz  14 g Take 400 mg calcium citrate 2:00 Snack (optional) ½ cup high protein, sugar-free pudding with 1 scoop added protein powder (see recipe in book). 4 oz 14 g   
 
3:00  5:30 pm  
Sip on Fluids Sip on non-carbonated, calorie-free, no sugar added liquids. 24 - 32 oz  
0 g Take 400 mg calcium citrate. 6:00 pm Dinner ¼ cup soft/flaky fish (tilapia, flounder, tuna) ¼ cup mashed potatoes or pureed cauliflower mashed potatoes (consider adding protein powder)  4 oz 14 g Take 400 mg of calcium citrate. 7:00 - 10:00 pm Sip on Fluids Sip on non-carbonated, no sugar added liquids as needed  16-24 oz 0 g Take Multivitamin with 18 mg ferrous sulfate Total:  64 oz fluids 63 
grams 2 Multivitamins with 18 mg ferrous sulfate, 9695-0518 mg calcium citrate Introducing [facility]! Romayne Duster introduces [employer]wheel patient portal. Now you can access parts of your medical record, email your doctor's office, and request medication refills online. 1. In your internet browser, go to [email or web address] 2. Click on the First Time User? Click Here link in the Sign In box. You will see the New Member Sign Up page. 3. Enter your Flywheel Access Code exactly as it appears below. You will not need to use this code after youve completed the sign-up process. If you do not sign up before the expiration date, you must request a new code. · [employer]wheel Access Code: [email or web address] Expires: 12/27/2017  8:42 AM 
 
4. Enter the last four digits of your Social Security Number (xxxx) and Date of Birth (mm/dd/yyyy) as indicated and click Submit. You will be taken to the next sign-up page. 5. Create a Flywheel ID. This will be your Flywheel login ID and cannot be changed, so think of one that is secure and easy to remember. 6. Create a Flywheel password. You can change your password at any time. 7. Enter your Password Reset Question and Answer. This can be used at a later time if you forget your password. 8. Enter your e-mail address. You will receive e-mail notification when new information is available in [address] 9. Click Sign Up. You can now view and download portions of your medical record. 10. Click the Download Summary menu link to download a portable copy of your medical information.  
 
If you have questions, please visit the Frequently Asked Questions section of the [employer]. Remember, [employer] is NOT to be used for urgent needs. For medical emergencies, dial 911. Now available from your iPhone and Android! Please provide this summary of care documentation to your next provider. Your primary care clinician is listed as Mono Cho. If you have any questions after today's visit, please call [phone].

## 2017-11-09 NOTE — PATIENT INSTRUCTIONS
What you need to know:  1 . Advance your diet to moist meats. Follow the handout that you were given today in the office. 2. Take the recommended vitamins daily  3 No lifting greater than 40 lbs. 4. You can do light jogging, moderate walking and a recumbent bike. 5 Follow up in 2 weeks. 6. You may go into a pool. 7. If you are not able to tolerate liquids, soft foods or moist meats. Please call our office. [phone]  8. If you have vomiting and persistent epigastric pain or chest pain. You should call our office, the doctor on-call or go to the emergency room. What to do if you are constipated: You may  take Milk of Magnesia. Take 2 Tablespoons followed by 16 oz of water then 2 hours later take another 2 tablespoons. If  milk of magnesia does not work then take [place] or Miralax over the counter. Keep in mind that the Benefiber or Miralax may take a day or two to work. If all of the above do not work try a Fleets enema and follow the directions on the box. Shopping List [employer]     Soft Mushy Diet: Phase 2 - Moist Meats     Below is a list of moist meats that you can now introduce into your diet. Moist Meats: Prepare food to the appropriate texture using low-fat cooking   methods       ? Tuna packed in water (strain before eating)  ? White flaky fish (martin, cod, flounder, tilapia, salmon)   ? White chicken breast packed in water (strain before eating)  ? 96-99% fat free thinly sliced deli meat (ham, turkey, roast beef)  ? Fat free non-stick spray  ? Silken Tofu  ? Low-fat or vegetarian refried beans  ? Well-cooked beans and lentils  ? Skinless turkey or chicken (prepare to a soft texture)  ? 93% lean pureed beef (round or sirloin only)  ? Lean pork (cooked until very tender, cut into small pieces)  ? Eggs (preferable egg whites)  ? Egg substitutes  ? Herbs and spices  ?  Lite butter, margarine, canola oil, olive oil, reduced-fat or fat-free kwon, reduced-fat or fat-free salad dressing, reduced-fat or fat-free cream cheese, reduced-fat or fat-free sour cream, lemon juice, salt, pepper, mustard, ketchup, salsa. See patient handbook for more low-fat cooking ideas. ? Be sure to use moist methods of cooking. Avoid microwaving meats because it can dry out the food making it harder to tolerate. Soft Mushy Diet: Phase 2  Time Meal or Snack Soft/Mushy Food Amount (ounces) Protein  (g) Supplement   6:30 am Sip on Fluids Sip on non-carbonated, calorie-free, no sugar added liquids. 8 oz   0 g Take Multivitamin containing 18 mg ferrous sulfate (iron)    7:00 am   Stop drinking fluids 30 minutes before breakfast   7:30 am Breakfast ¼ cup soft scrambled egg or egg substitute   ¼ cup canned fruit (packed in natural juice, strained)  4 oz    7 g    9:00 Snack  (optional) ½ cup low-fat or fat-free yogurt   or   ½ cup cottage cheese  4oz 4g  or  14 g    11:30 am Stop drinking liquids 30 minutes before lunch   12:00 pm Lunch ¼ cup low-fat or lean deli meat  with  ¼ well cooked green beans 4 oz  14 g Take 400 mg calcium citrate   2:00 Snack  (optional) ½ cup high protein, sugar-free pudding with 1 scoop added protein powder (see recipe in book). 4 oz 14 g      3:00 - 5:30 pm   Sip on Fluids   Sip on non-carbonated, calorie-free, no sugar added liquids. 24 - 32 oz   0 g   Take 400 mg calcium citrate. 6:00 pm Dinner ¼ cup soft/flaky fish (tilapia, flounder, tuna)  ¼ cup mashed potatoes or pureed cauliflower mashed potatoes (consider adding protein powder)  4 oz 14 g Take 400 mg of calcium citrate.    7:00 - 10:00 pm Sip on Fluids Sip on non-carbonated, no sugar added liquids as needed  16-24 oz 0 g Take Multivitamin with 18 mg ferrous sulfate   Total:  64 oz fluids 63  grams 2 Multivitamins with 18 mg ferrous sulfate, 7778-3886 mg calcium citrate

## 2017-11-09 NOTE — PROGRESS NOTES
1. Have you been to the ER, urgent care clinic since your last visit? Hospitalized since your last visit? No    2. Have you seen or consulted any other health care providers outside of the [address] [place] [address] since your last visit? Include any pap smears or colon screening.  No

## 2018-02-12 ENCOUNTER — TELEPHONE (OUTPATIENT)
Dept: SURGERY | Age: 43
End: 2018-02-12

## 2018-08-13 ENCOUNTER — TELEPHONE (OUTPATIENT)
Dept: SURGERY | Age: 43
End: 2018-08-13

## 2019-08-13 ENCOUNTER — TELEPHONE (OUTPATIENT)
Dept: SURGERY | Age: 44
End: 2019-08-13

## 2020-08-13 ENCOUNTER — TELEPHONE (OUTPATIENT)
Dept: SURGERY | Age: 45
End: 2020-08-13

## 2022-03-18 PROBLEM — E11.29 TYPE 2 DIABETES MELLITUS WITH KIDNEY COMPLICATION, WITHOUT LONG-TERM CURRENT USE OF INSULIN (HCC): Status: ACTIVE | Noted: 2017-02-09

## 2022-03-19 PROBLEM — I10 ESSENTIAL HYPERTENSION: Status: ACTIVE | Noted: 2017-02-09

## 2022-03-19 PROBLEM — E66.01 MORBID OBESITY (HCC): Status: ACTIVE | Noted: 2017-02-09

## 2022-03-19 PROBLEM — Z98.84 HISTORY OF WEIGHT LOSS SURGERY: Status: ACTIVE | Noted: 2017-10-30

## 2022-03-20 PROBLEM — N18.6 ESRD (END STAGE RENAL DISEASE) (HCC): Status: ACTIVE | Noted: 2017-02-09

## 2023-05-23 RX ORDER — OMEPRAZOLE 20 MG/1
20 TABLET, DELAYED RELEASE ORAL DAILY
COMMUNITY
Start: 2017-09-28

## (undated) DEVICE — BLACK INTELLIGENT RELOAD: Brand: TRI-STAPLE 2.0

## (undated) DEVICE — SOLUTION IV 1000ML 0.9% SOD CHL

## (undated) DEVICE — 1200 GUARD II KIT W/5MM TUBE W/O VAC TUBE: Brand: GUARDIAN

## (undated) DEVICE — CLICKLINE SCISSORS INSERT: Brand: CLICKLINE

## (undated) DEVICE — BLADELESS OPTICAL TROCAR WITH FIXATION CANNULA: Brand: VERSAPORT

## (undated) DEVICE — TUBING INSUFLTN 10FT LUER -- CONVERT TO ITEM 368568

## (undated) DEVICE — SUT ETHLN 2-0 18IN FS BLK --

## (undated) DEVICE — NEEDLE HYPO 22GA L1.5IN BLK S STL HUB POLYPR SHLD REG BVL

## (undated) DEVICE — SUTURE MCRYL SZ 4-0 L27IN ABSRB UD L19MM PS-2 1/2 CIR PRIM Y426H

## (undated) DEVICE — 39" SINGLE PATIENT USE HOVERMATT BREATHABLE: Brand: SINGLE PATIENT USE HOVERMATT

## (undated) DEVICE — STERILE POLYISOPRENE POWDER-FREE SURGICAL GLOVES WITH EMOLLIENT COATING: Brand: PROTEXIS

## (undated) DEVICE — REM POLYHESIVE ADULT PATIENT RETURN ELECTRODE: Brand: VALLEYLAB

## (undated) DEVICE — DRAIN CHN 19FR L0.25MM DIA6.3MM SIL RND HUBLESS FULL FLUT

## (undated) DEVICE — SOLUTION IRRIG 1000ML H2O STRL BLT

## (undated) DEVICE — Device

## (undated) DEVICE — KENDALL SCD EXPRESS SLEEVES, KNEE LENGTH, MEDIUM: Brand: KENDALL SCD

## (undated) DEVICE — FILTER SMK EVAC FLO CLR MEGADYNE

## (undated) DEVICE — BNDG ADHESIVE FABRIC 2X3.5IN -- CONVERT TO ITEM 357955

## (undated) DEVICE — (D)PREP SKN CHLRAPRP APPL 26ML -- CONVERT TO ITEM 371833

## (undated) DEVICE — Z INACTIVE USE 2240337 DRAPE SURG PT TRANSFER TRAWAY SHT

## (undated) DEVICE — UNIVERSAL FIXATION CANNULA: Brand: VERSAONE

## (undated) DEVICE — INFECTION CONTROL KIT SYS

## (undated) DEVICE — SUTURE SZ 0 27IN 5/8 CIR UR-6  TAPER PT VIOLET ABSRB VICRYL J603H

## (undated) DEVICE — MEDI-VAC NON-CONDUCTIVE SUCTION TUBING: Brand: CARDINAL HEALTH

## (undated) DEVICE — MARYLAND JAW LAPAROSCOPIC SEALER/DIVIDER: Brand: LIGASURE

## (undated) DEVICE — BLADELESS TROCAR WITH FIXATION CANNULA: Brand: VERSAPORT PLUS

## (undated) DEVICE — GOWN,SIRUS,NONRNF,SETINSLV,2XL,18/CS: Brand: MEDLINE

## (undated) DEVICE — DERMABOND SKIN ADH 0.7ML -- DERMABOND ADVANCED 12/BX

## (undated) DEVICE — DEVON™ KNEE AND BODY STRAP 60" X 3" (1.5 M X 7.6 CM): Brand: DEVON

## (undated) DEVICE — TROCAR SITE CLOSURE DEVICE: Brand: ENDO CLOSE

## (undated) DEVICE — VISUALIZATION SYSTEM: Brand: CLEARIFY

## (undated) DEVICE — ARTICULATING RELOAD WITH TRI-STAPLE TECHNOLOGY: Brand: ENDO GIA

## (undated) DEVICE — VISIGI 3D®  CALIBRATION SYSTEM  SIZE 40FR STD W/ BULB: Brand: BOEHRINGER® VISIGI 3D™ SLEEVE GASTRECTOMY CALIBRATION SYSTEM, SIZE 40FR W/BULB

## (undated) DEVICE — SURGICAL PROCEDURE KIT GEN LAPAROSCOPY LF